# Patient Record
Sex: MALE | Race: WHITE | NOT HISPANIC OR LATINO | Employment: FULL TIME | URBAN - METROPOLITAN AREA
[De-identification: names, ages, dates, MRNs, and addresses within clinical notes are randomized per-mention and may not be internally consistent; named-entity substitution may affect disease eponyms.]

---
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
## 2020-06-01 ENCOUNTER — HOSPITAL ENCOUNTER (OUTPATIENT)
Facility: MEDICAL CENTER | Age: 20
End: 2020-06-01
Attending: PHYSICIAN ASSISTANT
Payer: COMMERCIAL

## 2020-06-01 ENCOUNTER — OFFICE VISIT (OUTPATIENT)
Dept: URGENT CARE | Facility: PHYSICIAN GROUP | Age: 20
End: 2020-06-01
Payer: COMMERCIAL

## 2020-06-01 VITALS
HEART RATE: 64 BPM | TEMPERATURE: 98 F | BODY MASS INDEX: 20.72 KG/M2 | RESPIRATION RATE: 12 BRPM | DIASTOLIC BLOOD PRESSURE: 62 MMHG | SYSTOLIC BLOOD PRESSURE: 112 MMHG | OXYGEN SATURATION: 100 % | WEIGHT: 132 LBS | HEIGHT: 67 IN

## 2020-06-01 DIAGNOSIS — K21.9 GASTROESOPHAGEAL REFLUX DISEASE WITHOUT ESOPHAGITIS: Primary | ICD-10-CM

## 2020-06-01 DIAGNOSIS — R42 DIZZINESS: ICD-10-CM

## 2020-06-01 DIAGNOSIS — R11.0 NAUSEA: ICD-10-CM

## 2020-06-01 DIAGNOSIS — F41.9 ANXIETY: ICD-10-CM

## 2020-06-01 LAB
25(OH)D3 SERPL-MCNC: 35 NG/ML (ref 30–100)
ALBUMIN SERPL BCP-MCNC: 5.2 G/DL (ref 3.2–4.9)
ALBUMIN/GLOB SERPL: 1.9 G/DL
ALP SERPL-CCNC: 74 U/L (ref 30–99)
ALT SERPL-CCNC: 15 U/L (ref 2–50)
ANION GAP SERPL CALC-SCNC: 13 MMOL/L (ref 7–16)
AST SERPL-CCNC: 20 U/L (ref 12–45)
BASOPHILS # BLD AUTO: 0.3 % (ref 0–1.8)
BASOPHILS # BLD: 0.02 K/UL (ref 0–0.12)
BILIRUB SERPL-MCNC: 1.7 MG/DL (ref 0.1–1.5)
BUN SERPL-MCNC: 14 MG/DL (ref 8–22)
CALCIUM SERPL-MCNC: 9.7 MG/DL (ref 8.5–10.5)
CHLORIDE SERPL-SCNC: 100 MMOL/L (ref 96–112)
CO2 SERPL-SCNC: 25 MMOL/L (ref 20–33)
CREAT SERPL-MCNC: 0.83 MG/DL (ref 0.5–1.4)
EKG 4674: NORMAL
EOSINOPHIL # BLD AUTO: 0.03 K/UL (ref 0–0.51)
EOSINOPHIL NFR BLD: 0.4 % (ref 0–6.9)
ERYTHROCYTE [DISTWIDTH] IN BLOOD BY AUTOMATED COUNT: 35.6 FL (ref 35.9–50)
GLOBULIN SER CALC-MCNC: 2.8 G/DL (ref 1.9–3.5)
GLUCOSE SERPL-MCNC: 86 MG/DL (ref 65–99)
HCT VFR BLD AUTO: 48.3 % (ref 42–52)
HGB BLD-MCNC: 17.3 G/DL (ref 14–18)
IMM GRANULOCYTES # BLD AUTO: 0.02 K/UL (ref 0–0.11)
IMM GRANULOCYTES NFR BLD AUTO: 0.3 % (ref 0–0.9)
LYMPHOCYTES # BLD AUTO: 1.34 K/UL (ref 1–4.8)
LYMPHOCYTES NFR BLD: 17.4 % (ref 22–41)
MCH RBC QN AUTO: 30.4 PG (ref 27–33)
MCHC RBC AUTO-ENTMCNC: 35.8 G/DL (ref 33.7–35.3)
MCV RBC AUTO: 84.7 FL (ref 81.4–97.8)
MONOCYTES # BLD AUTO: 0.57 K/UL (ref 0–0.85)
MONOCYTES NFR BLD AUTO: 7.4 % (ref 0–13.4)
NEUTROPHILS # BLD AUTO: 5.71 K/UL (ref 1.82–7.42)
NEUTROPHILS NFR BLD: 74.2 % (ref 44–72)
NRBC # BLD AUTO: 0 K/UL
NRBC BLD-RTO: 0 /100 WBC
PLATELET # BLD AUTO: 287 K/UL (ref 164–446)
PMV BLD AUTO: 9 FL (ref 9–12.9)
POTASSIUM SERPL-SCNC: 4.4 MMOL/L (ref 3.6–5.5)
PROT SERPL-MCNC: 8 G/DL (ref 6–8.2)
RBC # BLD AUTO: 5.7 M/UL (ref 4.7–6.1)
SODIUM SERPL-SCNC: 138 MMOL/L (ref 135–145)
TSH SERPL DL<=0.005 MIU/L-ACNC: 0.69 UIU/ML (ref 0.38–5.33)
WBC # BLD AUTO: 7.7 K/UL (ref 4.8–10.8)

## 2020-06-01 PROCEDURE — 85025 COMPLETE CBC W/AUTO DIFF WBC: CPT

## 2020-06-01 PROCEDURE — 84443 ASSAY THYROID STIM HORMONE: CPT

## 2020-06-01 PROCEDURE — 99204 OFFICE O/P NEW MOD 45 MIN: CPT | Performed by: PHYSICIAN ASSISTANT

## 2020-06-01 PROCEDURE — 82306 VITAMIN D 25 HYDROXY: CPT

## 2020-06-01 PROCEDURE — 80053 COMPREHEN METABOLIC PANEL: CPT

## 2020-06-01 PROCEDURE — 93000 ELECTROCARDIOGRAM COMPLETE: CPT | Performed by: PHYSICIAN ASSISTANT

## 2020-06-01 RX ORDER — PROCHLORPERAZINE MALEATE 5 MG/1
5 TABLET ORAL EVERY 6 HOURS PRN
Qty: 30 TAB | Refills: 3 | Status: SHIPPED | OUTPATIENT
Start: 2020-06-01 | End: 2020-06-11

## 2020-06-01 RX ORDER — OMEPRAZOLE 20 MG/1
20 CAPSULE, DELAYED RELEASE ORAL DAILY
Qty: 30 CAP | Refills: 3 | Status: SHIPPED | OUTPATIENT
Start: 2020-06-01 | End: 2020-06-25 | Stop reason: SDUPTHER

## 2020-06-01 RX ORDER — DIAZEPAM 2 MG/1
2 TABLET ORAL EVERY 6 HOURS PRN
Qty: 30 TAB | Refills: 0 | Status: SHIPPED | OUTPATIENT
Start: 2020-06-01 | End: 2020-06-08

## 2020-06-01 NOTE — PATIENT INSTRUCTIONS
Panic Attacks  Panic attacks are sudden, short feelings of great fear or discomfort. You may have them for no reason when you are relaxed, when you are uneasy (anxious), or when you are sleeping.  Follow these instructions at home:  · Take all your medicines as told.  · Check with your doctor before starting new medicines.  · Keep all doctor visits.  Contact a doctor if:  · You are not able to take your medicines as told.  · Your symptoms do not get better.  · Your symptoms get worse.  Get help right away if:  · Your attacks seem different than your normal attacks.  · You have thoughts about hurting yourself or others.  · You take panic attack medicine and you have a side effect.  This information is not intended to replace advice given to you by your health care provider. Make sure you discuss any questions you have with your health care provider.  Document Released: 01/20/2012 Document Revised: 05/25/2017 Document Reviewed: 08/01/2014  [employer] Interactive Patient Education © 2017 [employer]

## 2020-06-01 NOTE — PROGRESS NOTES
Subjective:      Pt is a 19 y.o. male who presents with Dizziness (Dizziness, heartburn, bloating, nausea, anxiety/panicing  x2days )            HPI  This is a new problem. Pt notes anxiety and panic which has never been diagnosed which he deals with from time to time but had a nosebleed last night which happens rarely and he also noted lightheadedness, dizziness, nausea, heartburn and panic attack with heart racing x 2 days. Pt has not taken any Rx medications for this condition. Pt states the pain is a 7/10, aching in nature and worse at night. Pt denies  SOB,  paresthesias, headaches, hives, or other joint pain. The pt's medication list, problem list, and allergies have been evaluated and reviewed during today's visit.    PMH:  Negative per pt.      PSH:  Negative per pt.      Fam Hx:    family history includes Cancer in his mother; Hyperlipidemia in his father.  Family Status   Relation Name Status   • Mo     • Fa  Alive       Soc HX:  Social History     Socioeconomic History   • Marital status: Single     Spouse name: Not on file   • Number of children: Not on file   • Years of education: Not on file   • Highest education level: Not on file   Occupational History   • Not on file   Social Needs   • Financial resource strain: Not on file   • Food insecurity     Worry: Not on file     Inability: Not on file   • Transportation needs     Medical: Not on file     Non-medical: Not on file   Tobacco Use   • Smoking status: Never Smoker   • Smokeless tobacco: Current User     Types: Chew   Substance and Sexual Activity   • Alcohol use: No   • Drug use: No   • Sexual activity: Never   Lifestyle   • Physical activity     Days per week: Not on file     Minutes per session: Not on file   • Stress: Not on file   Relationships   • Social connections     Talks on phone: Not on file     Gets together: Not on file     Attends Episcopal service: Not on file     Active member of club or organization: Not on file      Attends meetings of clubs or organizations: Not on file     Relationship status: Not on file   • Intimate partner violence     Fear of current or ex partner: Not on file     Emotionally abused: Not on file     Physically abused: Not on file     Forced sexual activity: Not on file   Other Topics Concern   • Behavioral problems Not Asked   • Interpersonal relationships Not Asked   • Sad or not enjoying activities Not Asked   • Suicidal thoughts Not Asked   • Poor school performance Not Asked   • Reading difficulties Not Asked   • Speech difficulties Not Asked   • Writing difficulties Not Asked   • Inadequate sleep Not Asked   • Excessive TV viewing Not Asked   • Excessive video game use Not Asked   • Inadequate exercise Not Asked   • Sports related Not Asked   • Poor diet Not Asked   • Family concerns for drug/alcohol abuse Not Asked   • Poor oral hygiene Not Asked   • Bike safety Not Asked   • Family concerns vehicle safety Not Asked   Social History Narrative   • Not on file         Medications:    Current Outpatient Medications:   •  prochlorperazine (COMPAZINE) 5 MG Tab, Take 1 Tab by mouth every 6 hours as needed for Nausea/Vomiting., Disp: 30 Tab, Rfl: 3  •  omeprazole (PRILOSEC) 20 MG delayed-release capsule, Take 1 Cap by mouth every day for 30 days., Disp: 30 Cap, Rfl: 3  •  diazePAM (VALIUM) 2 MG Tab, Take 1 Tab by mouth every 6 hours as needed for Anxiety for up to 7 days., Disp: 30 Tab, Rfl: 0      Allergies:  Patient has no known allergies.    ROS   Constitutional: Negative for fever, chills and malaise/fatigue.   HENT: Negative for congestion and sore throat.    Eyes: Negative for blurred vision, double vision and photophobia.   Respiratory: Negative for cough and shortness of breath.  Cardiovascular: POS for chest pain and palpitations.   Gastrointestinal: POS for heartburn, nausea, NEG vomiting, abdominal pain, diarrhea and constipation.   Genitourinary: Negative for dysuria and flank pain.  "  Musculoskeletal: Negative for joint pain and myalgias.   Skin: Negative for itching and rash.   Neurological: POS dizziness  Endo/Heme/Allergies: Does not bruise/bleed easily.   Psychiatric/Behavioral: POS anxiety and panic attacks.             Objective:     /62   Pulse 64   Temp 36.7 °C (98 °F) (Temporal)   Resp 12   Ht 1.71 m (5' 7.32\")   Wt 59.9 kg (132 lb)   SpO2 100%   BMI 20.48 kg/m²      Physical Exam       Constitutional: PT is oriented to person, place, and time. PT appears well-developed and well-nourished. No distress.   HENT:   Head: Normocephalic and atraumatic.   Mouth/Throat: Oropharynx is clear and moist. No oropharyngeal exudate.   Eyes: Conjunctivae normal and EOM are normal. Pupils are equal, round, and reactive to light.   Neck: Normal range of motion. Neck supple. No thyromegaly present.   Cardiovascular: Normal rate, regular rhythm, normal heart sounds and intact distal pulses.  Exam reveals no gallop and no friction rub.    No murmur heard.  Pulmonary/Chest: Effort normal and breath sounds normal. No respiratory distress. PT has no wheezes. PT has no rales. Pt exhibits no tenderness.   Abdominal: Soft. Bowel sounds are normal. PT exhibits no distension and no mass. There is no tenderness. There is no rebound and no guarding.   Musculoskeletal: Normal range of motion. PT exhibits no edema and no tenderness.   Neurological: PT is alert and oriented to person, place, and time. PT has normal reflexes. No cranial nerve deficit.   Skin: Skin is warm and dry. No rash noted. PT is not diaphoretic. No erythema.       Psychiatric: PT has a normal mood and affect. PT behavior is normal. Judgment and thought content normal.        Assessment/Plan:       1. Gastroesophageal reflux disease without esophagitis    - omeprazole (PRILOSEC) 20 MG delayed-release capsule; Take 1 Cap by mouth every day for 30 days.  Dispense: 30 Cap; Refill: 3    2. Dizziness    - EKG  - prochlorperazine " (COMPAZINE) 5 MG Tab; Take 1 Tab by mouth every 6 hours as needed for Nausea/Vomiting.  Dispense: 30 Tab; Refill: 3  - CBC WITH DIFFERENTIAL; Future  - Comp Metabolic Panel; Future  - TSH WITH REFLEX TO FT4; Future  - VITAMIN D,25 HYDROXY; Future    3. Anxiety    - REFERRAL TO PSYCHIATRY  - diazePAM (VALIUM) 2 MG Tab; Take 1 Tab by mouth every 6 hours as needed for Anxiety for up to 7 days.  Dispense: 30 Tab; Refill: 0    4. Nausea    - prochlorperazine (COMPAZINE) 5 MG Tab; Take 1 Tab by mouth every 6 hours as needed for Nausea/Vomiting.  Dispense: 30 Tab; Refill: 3  - CBC WITH DIFFERENTIAL; Future  - Comp Metabolic Panel; Future  - TSH WITH REFLEX TO FT4; Future  - VITAMIN D,25 HYDROXY; Future      EKG-->NSR  Rest, fluids encouraged.  Concern for undiagnosed panic and anxiety syndrome: STAT referral to Psych for appropriate evaluation and treatment  Note given for work.  AVS with medical info given.  Pt was in full understanding and agreement with the plan.  Differential diagnosis, natural history, supportive care, and indications for immediate follow-up discussed. All questions answered. Patient agrees with the plan of care.  Follow-up as needed if symptoms worsen or fail to improve to PCP, Urgent care or Emergency Room.

## 2020-06-01 NOTE — LETTER
June 1, 2020       Patient: Jhon Hoff   YOB: 2000   Date of Visit: 6/1/2020         To Whom It May Concern:    In my medical opinion, I recommend that Jhon Hoff may be excused from work for the dates of 6/1/20-6/5/20.      If you have any questions or concerns, please don't hesitate to call [phone]          Sincerely,          Marco Liu P.A.-C.  Electronically Signed

## 2020-06-02 ENCOUNTER — TELEPHONE (OUTPATIENT)
Dept: URGENT CARE | Facility: PHYSICIAN GROUP | Age: 20
End: 2020-06-02

## 2020-06-03 ENCOUNTER — HOSPITAL ENCOUNTER (EMERGENCY)
Facility: MEDICAL CENTER | Age: 20
End: 2020-06-03
Attending: EMERGENCY MEDICINE
Payer: COMMERCIAL

## 2020-06-03 ENCOUNTER — APPOINTMENT (OUTPATIENT)
Dept: RADIOLOGY | Facility: MEDICAL CENTER | Age: 20
End: 2020-06-03
Attending: EMERGENCY MEDICINE
Payer: COMMERCIAL

## 2020-06-03 VITALS
BODY MASS INDEX: 21.07 KG/M2 | SYSTOLIC BLOOD PRESSURE: 109 MMHG | OXYGEN SATURATION: 94 % | DIASTOLIC BLOOD PRESSURE: 57 MMHG | HEART RATE: 63 BPM | WEIGHT: 134.26 LBS | HEIGHT: 67 IN | RESPIRATION RATE: 19 BRPM | TEMPERATURE: 98.3 F

## 2020-06-03 DIAGNOSIS — R42 VERTIGO: ICD-10-CM

## 2020-06-03 LAB
ALBUMIN SERPL BCP-MCNC: 4.9 G/DL (ref 3.2–4.9)
ALBUMIN/GLOB SERPL: 2 G/DL
ALP SERPL-CCNC: 66 U/L (ref 30–99)
ALT SERPL-CCNC: 9 U/L (ref 2–50)
AMPHET UR QL SCN: NEGATIVE
ANION GAP SERPL CALC-SCNC: 13 MMOL/L (ref 7–16)
APPEARANCE UR: CLEAR
AST SERPL-CCNC: 16 U/L (ref 12–45)
BARBITURATES UR QL SCN: NEGATIVE
BASOPHILS # BLD AUTO: 0.5 % (ref 0–1.8)
BASOPHILS # BLD: 0.03 K/UL (ref 0–0.12)
BENZODIAZ UR QL SCN: NEGATIVE
BILIRUB SERPL-MCNC: 0.6 MG/DL (ref 0.1–1.5)
BILIRUB UR QL STRIP.AUTO: NEGATIVE
BUN SERPL-MCNC: 16 MG/DL (ref 8–22)
BZE UR QL SCN: NEGATIVE
CALCIUM SERPL-MCNC: 9.1 MG/DL (ref 8.4–10.2)
CANNABINOIDS UR QL SCN: POSITIVE
CHLORIDE SERPL-SCNC: 103 MMOL/L (ref 96–112)
CO2 SERPL-SCNC: 25 MMOL/L (ref 20–33)
COLOR UR: YELLOW
CREAT SERPL-MCNC: 0.99 MG/DL (ref 0.5–1.4)
EKG IMPRESSION: NORMAL
EOSINOPHIL # BLD AUTO: 0.08 K/UL (ref 0–0.51)
EOSINOPHIL NFR BLD: 1.3 % (ref 0–6.9)
ERYTHROCYTE [DISTWIDTH] IN BLOOD BY AUTOMATED COUNT: 35.4 FL (ref 35.9–50)
GLOBULIN SER CALC-MCNC: 2.5 G/DL (ref 1.9–3.5)
GLUCOSE SERPL-MCNC: 106 MG/DL (ref 65–99)
GLUCOSE UR STRIP.AUTO-MCNC: NEGATIVE MG/DL
HCT VFR BLD AUTO: 45.5 % (ref 42–52)
HGB BLD-MCNC: 16 G/DL (ref 14–18)
IMM GRANULOCYTES # BLD AUTO: 0.02 K/UL (ref 0–0.11)
IMM GRANULOCYTES NFR BLD AUTO: 0.3 % (ref 0–0.9)
KETONES UR STRIP.AUTO-MCNC: NEGATIVE MG/DL
LEUKOCYTE ESTERASE UR QL STRIP.AUTO: NEGATIVE
LIPASE SERPL-CCNC: 23 U/L (ref 7–58)
LYMPHOCYTES # BLD AUTO: 1.78 K/UL (ref 1–4.8)
LYMPHOCYTES NFR BLD: 29.7 % (ref 22–41)
MCH RBC QN AUTO: 29.7 PG (ref 27–33)
MCHC RBC AUTO-ENTMCNC: 35.2 G/DL (ref 33.7–35.3)
MCV RBC AUTO: 84.6 FL (ref 81.4–97.8)
METHADONE UR QL SCN: NEGATIVE
MICRO URNS: NORMAL
MONOCYTES # BLD AUTO: 0.52 K/UL (ref 0–0.85)
MONOCYTES NFR BLD AUTO: 8.7 % (ref 0–13.4)
NEUTROPHILS # BLD AUTO: 3.57 K/UL (ref 1.82–7.42)
NEUTROPHILS NFR BLD: 59.5 % (ref 44–72)
NITRITE UR QL STRIP.AUTO: NEGATIVE
NRBC # BLD AUTO: 0 K/UL
NRBC BLD-RTO: 0 /100 WBC
OPIATES UR QL SCN: NEGATIVE
OXYCODONE UR QL SCN: NEGATIVE
PCP UR QL SCN: NEGATIVE
PH UR STRIP.AUTO: 7 [PH] (ref 5–8)
PLATELET # BLD AUTO: 239 K/UL (ref 164–446)
PMV BLD AUTO: 8.8 FL (ref 9–12.9)
POTASSIUM SERPL-SCNC: 4.7 MMOL/L (ref 3.6–5.5)
PROPOXYPH UR QL SCN: NEGATIVE
PROT SERPL-MCNC: 7.4 G/DL (ref 6–8.2)
PROT UR QL STRIP: NEGATIVE MG/DL
RBC # BLD AUTO: 5.38 M/UL (ref 4.7–6.1)
RBC UR QL AUTO: NEGATIVE
SODIUM SERPL-SCNC: 141 MMOL/L (ref 135–145)
SP GR UR STRIP.AUTO: <=1.005
WBC # BLD AUTO: 6 K/UL (ref 4.8–10.8)

## 2020-06-03 PROCEDURE — 83690 ASSAY OF LIPASE: CPT

## 2020-06-03 PROCEDURE — 700111 HCHG RX REV CODE 636 W/ 250 OVERRIDE (IP): Performed by: EMERGENCY MEDICINE

## 2020-06-03 PROCEDURE — 80307 DRUG TEST PRSMV CHEM ANLYZR: CPT

## 2020-06-03 PROCEDURE — 96374 THER/PROPH/DIAG INJ IV PUSH: CPT

## 2020-06-03 PROCEDURE — 93005 ELECTROCARDIOGRAM TRACING: CPT

## 2020-06-03 PROCEDURE — 70450 CT HEAD/BRAIN W/O DYE: CPT

## 2020-06-03 PROCEDURE — 85025 COMPLETE CBC W/AUTO DIFF WBC: CPT

## 2020-06-03 PROCEDURE — 81003 URINALYSIS AUTO W/O SCOPE: CPT

## 2020-06-03 PROCEDURE — 99284 EMERGENCY DEPT VISIT MOD MDM: CPT

## 2020-06-03 PROCEDURE — 80053 COMPREHEN METABOLIC PANEL: CPT

## 2020-06-03 RX ORDER — ONDANSETRON 2 MG/ML
4 INJECTION INTRAMUSCULAR; INTRAVENOUS ONCE
Status: COMPLETED | OUTPATIENT
Start: 2020-06-03 | End: 2020-06-03

## 2020-06-03 RX ORDER — MECLIZINE HCL 12.5 MG/1
12.5 TABLET ORAL 3 TIMES DAILY PRN
Qty: 30 TAB | Refills: 0 | Status: SHIPPED | OUTPATIENT
Start: 2020-06-03 | End: 2020-06-11 | Stop reason: SDUPTHER

## 2020-06-03 RX ADMIN — ONDANSETRON 4 MG: 2 INJECTION INTRAMUSCULAR; INTRAVENOUS at 09:52

## 2020-06-03 ASSESSMENT — FIBROSIS 4 INDEX: FIB4 SCORE: 0.36

## 2020-06-03 NOTE — TELEPHONE ENCOUNTER
"PT notes feeling worse today and like \"passing out\". Encouraged to go to ER for full work up and he was in agreement.  Marco Liu Pa-C  "

## 2020-06-03 NOTE — ED PROVIDER NOTES
ED Provider Note    CHIEF COMPLAINT  Chief Complaint   Patient presents with   • Dizziness   • Epigastric Pain       HPI  Jhon Hoff is a 19 y.o. male who presents with a chief complaint of dizziness.  Symptoms started about 4 days ago.  He states that he thought he was just overheated from excessive work.  When he lays down he feels motion sickness.  He sits up and get slightly better, but still feeling dizzy.  This makes him nervous and anxious.  Nauseous without vomiting.  He still was having epigastric abdominal discomfort.  He was seen by primary provider 2 days ago.  Started on a medication that has improved this but he still feels mildly nauseous.  Has had normal bowel movements.  He has never vomited.  Denies chest pain.  Denies shortness of breath, weakness, numbness, neurologic symptoms, vision change.  He occasionally will get headaches, but no terrible headache currently.  No ear pain.  No recent illness.  No loss of taste or smell, but has had the loss of appetite.    He was prescribed Compazine and Valium at his previous visit on review of the chart.  He was referred to psychiatry for anxiety.  TSH was normal among other laboratory data    Does not drink alcohol.  Daily marijuana.  No other illicit drugs.    REVIEW OF SYSTEMS  As per HPI, otherwise a 10 point review of systems is negative    PAST MEDICAL HISTORY  History reviewed. No pertinent past medical history.    SOCIAL HISTORY  Social History     Tobacco Use   • Smoking status: Never Smoker   • Smokeless tobacco: Current User     Types: Chew   Substance Use Topics   • Alcohol use: No   • Drug use: Yes     Comment: marijuana daily       SURGICAL HISTORY  History reviewed. No pertinent surgical history.    CURRENT MEDICATIONS  Home Medications     Reviewed by Faith Coffman R.N. (Registered Nurse) on 06/03/20 at 0922  Med List Status: Partial   Medication Last Dose Status   diazePAM (VALIUM) 2 MG Tab  Active   omeprazole (PRILOSEC) 20 MG  "delayed-release capsule  Active   prochlorperazine (COMPAZINE) 5 MG Tab  Active                ALLERGIES  No Known Allergies    PHYSICAL EXAM  VITAL SIGNS: /68   Pulse (!) 57   Temp 36.8 °C (98.3 °F) (Temporal)   Resp 15   Ht 1.702 m (5' 7\")   Wt 60.9 kg (134 lb 4.2 oz)   SpO2 98%   BMI 21.03 kg/m²    Constitutional: Awake and alert  HENT:  Atraumatic, Normocephalic.Oropharynx dry mucus membranes, Nose normal inspection.   Eyes: Normal inspection  Neck: Supple  Cardiovascular: Normal heart rate, Normal rhythm.  Symmetric peripheral pulses.   Thorax & Lungs: No respiratory distress, No wheezing, No rales, No rhonchi, No chest tenderness.   Abdomen: Bowel sounds normal, soft, non-distended, nontender, no mass  Skin: Warm, Dry, No rash.   Back: No tenderness, No CVA tenderness.   Extremities: No clubbing, cyanosis, edema, no Homans or cords   Neurologic: Awake alert oriented.  Cranial nerves intact.  No nystagmus.  No visual field cut.  Symmetric motor and sensory with 5/5 strength.  Steady gait.  Normal finger-nose  Psychiatric: Anxious appearing    RADIOLOGY/PROCEDURES  CT-HEAD W/O   Final Result         1. No acute intracranial abnormality. No evidence of acute intracranial hemorrhage or mass lesion.                    Imaging is interpreted by radiologist    Labs:  Results for orders placed or performed during the hospital encounter of 06/03/20   CBC WITH DIFFERENTIAL   Result Value Ref Range    WBC 6.0 4.8 - 10.8 K/uL    RBC 5.38 4.70 - 6.10 M/uL    Hemoglobin 16.0 14.0 - 18.0 g/dL    Hematocrit 45.5 42.0 - 52.0 %    MCV 84.6 81.4 - 97.8 fL    MCH 29.7 27.0 - 33.0 pg    MCHC 35.2 33.7 - 35.3 g/dL    RDW 35.4 (L) 35.9 - 50.0 fL    Platelet Count 239 164 - 446 K/uL    MPV 8.8 (L) 9.0 - 12.9 fL    Neutrophils-Polys 59.50 44.00 - 72.00 %    Lymphocytes 29.70 22.00 - 41.00 %    Monocytes 8.70 0.00 - 13.40 %    Eosinophils 1.30 0.00 - 6.90 %    Basophils 0.50 0.00 - 1.80 %    Immature Granulocytes 0.30 0.00 " - 0.90 %    Nucleated RBC 0.00 /100 WBC    Neutrophils (Absolute) 3.57 1.82 - 7.42 K/uL    Lymphs (Absolute) 1.78 1.00 - 4.80 K/uL    Monos (Absolute) 0.52 0.00 - 0.85 K/uL    Eos (Absolute) 0.08 0.00 - 0.51 K/uL    Baso (Absolute) 0.03 0.00 - 0.12 K/uL    Immature Granulocytes (abs) 0.02 0.00 - 0.11 K/uL    NRBC (Absolute) 0.00 K/uL   COMP METABOLIC PANEL   Result Value Ref Range    Sodium 141 135 - 145 mmol/L    Potassium 4.7 3.6 - 5.5 mmol/L    Chloride 103 96 - 112 mmol/L    Co2 25 20 - 33 mmol/L    Anion Gap 13.0 7.0 - 16.0    Glucose 106 (H) 65 - 99 mg/dL    Bun 16 8 - 22 mg/dL    Creatinine 0.99 0.50 - 1.40 mg/dL    Calcium 9.1 8.4 - 10.2 mg/dL    AST(SGOT) 16 12 - 45 U/L    ALT(SGPT) 9 2 - 50 U/L    Alkaline Phosphatase 66 30 - 99 U/L    Total Bilirubin 0.6 0.1 - 1.5 mg/dL    Albumin 4.9 3.2 - 4.9 g/dL    Total Protein 7.4 6.0 - 8.2 g/dL    Globulin 2.5 1.9 - 3.5 g/dL    A-G Ratio 2.0 g/dL   LIPASE   Result Value Ref Range    Lipase 23 7 - 58 U/L   URINALYSIS CULTURE, IF INDICATED    Specimen: Urine   Result Value Ref Range    Color Yellow     Character Clear     Specific Gravity <=1.005 <1.035    Ph 7.0 5.0 - 8.0    Glucose Negative Negative mg/dL    Ketones Negative Negative mg/dL    Protein Negative Negative mg/dL    Bilirubin Negative Negative    Nitrite Negative Negative    Leukocyte Esterase Negative Negative    Occult Blood Negative Negative    Micro Urine Req see below    URINE DRUG SCREEN   Result Value Ref Range    Amphetamines Urine Negative Negative    Barbiturates Negative Negative    Benzodiazepines Negative Negative    Cocaine Metabolite Negative Negative    Methadone Negative Negative    Opiates Negative Negative    Oxycodone Negative Negative    Phencyclidine -Pcp Negative Negative    Propoxyphene Negative Negative    Cannabinoid Metab Positive (A) Negative   ESTIMATED GFR   Result Value Ref Range    GFR If African American >60 >60 mL/min/1.73 m 2    GFR If Non  >60 >60  mL/min/1.73 m 2   EKG   Result Value Ref Range    Report       [facility] Emergency Dept.    Test Date:  2020  Pt Name:    VAL JEREZ                  Department[facility]  MRN:        [id number]                      [facility]  Gender:     Male                         Technician: NOEL  :        2000                   Requested By:ER TRIAGE PROTOCOL  Order #:    [id number]                    Reading MD: ANGELICA NGUYEN MD    Measurements  Intervals                                Axis  Rate:       69                           P:          72  MS:         120                          QRS:        37  QRSD:       92                           T:          43  QT:         388  QTc:        416    Interpretive Statements  SINUS RHYTHM  RSR' IN V1 OR V2, PROBABLY NORMAL VARIANT  ST ELEV, PROBABLE NORMAL EARLY REPOL PATTERN  No previous ECG available for comparison  Electronically Signed On 6-3-2020 11:21:37 PDT by ANGELICA NGUYEN MD         Medications   ondansetron (ZOFRAN) syringe/vial injection 4 mg (4 mg Intravenous Given 6/3/20 0952)       COURSE & MEDICAL DECISION MAKING  Presents with motion sickness sensation without any neurologic or other physical findings..  Symptoms are persistent by 2 management.  I repeated laboratory data.  Data was reassuring.  CT imaging of the brain was obtained and was negative.  Was treated with Zofran with improved symptoms.  Suspect most likely viral illness.  Perhaps has labyrinthitis.  He will be treated with meclizine.  He will need further evaluation of persistent symptoms.  Advised continue with plan per previously directed by his primary provider.  He should return to the ER for any headache, focal weakness or numbness, chest pain, shortness of breath or concerning symptoms.    FINAL IMPRESSION  1.  Dizziness-suspected labyrinthitis  2.  Epigastric abdominal pain-suspect gastritis  3.  Anxiety      This dictation was created using voice  recognition software. The accuracy of the dictation is limited to the abilities of the software.  The nursing notes were reviewed and certain aspects of this information were incorporated into this note.      Electronically signed by: Alessandro Lee M.D., 6/3/2020 11:17 AM

## 2020-06-11 ENCOUNTER — OFFICE VISIT (OUTPATIENT)
Dept: MEDICAL GROUP | Facility: MEDICAL CENTER | Age: 20
End: 2020-06-11
Payer: COMMERCIAL

## 2020-06-11 VITALS
WEIGHT: 132.28 LBS | HEIGHT: 67 IN | RESPIRATION RATE: 16 BRPM | TEMPERATURE: 98.1 F | DIASTOLIC BLOOD PRESSURE: 72 MMHG | OXYGEN SATURATION: 98 % | SYSTOLIC BLOOD PRESSURE: 118 MMHG | BODY MASS INDEX: 20.76 KG/M2 | HEART RATE: 94 BPM

## 2020-06-11 DIAGNOSIS — R42 DIZZINESS: ICD-10-CM

## 2020-06-11 PROBLEM — Z00.00 ANNUAL PHYSICAL EXAM: Status: RESOLVED | Noted: 2017-01-24 | Resolved: 2020-06-11

## 2020-06-11 PROCEDURE — 99214 OFFICE O/P EST MOD 30 MIN: CPT | Performed by: PHYSICIAN ASSISTANT

## 2020-06-11 RX ORDER — MECLIZINE HCL 12.5 MG/1
12.5 TABLET ORAL 3 TIMES DAILY PRN
Qty: 30 TAB | Refills: 2 | Status: SHIPPED | OUTPATIENT
Start: 2020-06-11 | End: 2020-07-02

## 2020-06-11 RX ORDER — DIAZEPAM 2 MG/1
TABLET ORAL
COMMUNITY
Start: 2020-06-01 | End: 2020-06-11

## 2020-06-11 RX ORDER — OMEPRAZOLE 20 MG/1
CAPSULE, DELAYED RELEASE ORAL
COMMUNITY
Start: 2020-06-01 | End: 2020-06-11

## 2020-06-11 RX ORDER — PROCHLORPERAZINE MALEATE 5 MG/1
TABLET ORAL
COMMUNITY
Start: 2020-06-01 | End: 2020-06-11

## 2020-06-11 ASSESSMENT — FIBROSIS 4 INDEX: FIB4 SCORE: 0.45

## 2020-06-11 ASSESSMENT — ANXIETY QUESTIONNAIRES
2. NOT BEING ABLE TO STOP OR CONTROL WORRYING: MORE THAN HALF THE DAYS
3. WORRYING TOO MUCH ABOUT DIFFERENT THINGS: MORE THAN HALF THE DAYS
4. TROUBLE RELAXING: NOT AT ALL
1. FEELING NERVOUS, ANXIOUS, OR ON EDGE: NEARLY EVERY DAY
5. BEING SO RESTLESS THAT IT IS HARD TO SIT STILL: NOT AT ALL
6. BECOMING EASILY ANNOYED OR IRRITABLE: MORE THAN HALF THE DAYS
7. FEELING AFRAID AS IF SOMETHING AWFUL MIGHT HAPPEN: MORE THAN HALF THE DAYS
GAD7 TOTAL SCORE: 11

## 2020-06-11 ASSESSMENT — PATIENT HEALTH QUESTIONNAIRE - PHQ9
CLINICAL INTERPRETATION OF PHQ2 SCORE: 2
5. POOR APPETITE OR OVEREATING: 1 - SEVERAL DAYS
SUM OF ALL RESPONSES TO PHQ QUESTIONS 1-9: 11

## 2020-06-11 NOTE — PROGRESS NOTES
"Subjective:   Jhon Hoff is a 19 y.o. male here today for dizziness for approximately 10 days.    Dizziness  This is a 19-year-old male accompanied by his father, Juvenal.  Here today to discuss a history at least since the first of dizziness.  Was initially seen at urgent care and diagnosed with reflux.  Was placed on omeprazole.  Medication has been effective.  Then 2 days later was seen at the ER and diagnosed with possible labyrinth-itis.  Complains of some episodes where the room spins.  Sometimes he feels like he is spinning.  He has some nauseousness.  These symptoms appear to send him into a tailspin where he feels anxious.  He will feel hot with his chest.  He was given meclizine 12.5 mg which he takes 3 times a day.  Dedication has been effective.  On Saturday he stopped the medication and his symptoms return.  He noticed symptoms when he is lying on the left side.  He will set up and they will resolve.  Goes back to the left side and they will return.  CT scan performed was unremarkable.  Denies any change in vision.  No hearing loss.  No tinnitus.  No abdominal pain.       Current medicines (including changes today)  Current Outpatient Medications   Medication Sig Dispense Refill   • meclizine (ANTIVERT) 12.5 MG Tab Take 1 Tab by mouth 3 times a day as needed for Dizziness. 30 Tab 2   • omeprazole (PRILOSEC) 20 MG delayed-release capsule Take 1 Cap by mouth every day for 30 days. 30 Cap 3     No current facility-administered medications for this visit.      He  has no past medical history on file.    Social History and Family History were reviewed and updated.    ROS   No chest pain, no shortness of breath, no abdominal pain and all other systems were reviewed and are negative.       Objective:     /72   Pulse 94   Temp 36.7 °C (98.1 °F) (Temporal)   Resp 16   Ht 1.702 m (5' 7\")   Wt 60 kg (132 lb 4.4 oz)   SpO2 98%  Body mass index is 20.72 kg/m².   Physical Exam:  Constitutional: Alert, no " distress.  Skin: Warm, dry, good turgor, no rashes in visible areas.  Eye: Equal, round and reactive, conjunctiva clear, lids normal.  ENMT: Lips without lesions, good dentition, oropharynx clear.  TMs bilaterally clear.  Neck: Trachea midline, no masses.   Respiratory: Unlabored respiratory effort, lungs clear to auscultation, no wheezes, no ronchi.  Cardiovascular: Normal S1, S2, no murmur, no edema.  Musculoskeletal: CN II through XII intact.  Arlet-Hallpike test negative.  Psych: Alert and oriented x3, normal affect and mood.        Assessment and Plan:   The following treatment plan was discussed    1. Dizziness  Acute, new onset condition.  Symptoms appear to be secondary to vertigo.  Discussed performing modified Epley maneuvers.  Printed out worksheet.  May do 3 times a day.  If effective did refer him to PT for vestibular therapy.  Also provided a renewal of meclizine.  May take it as needed.  Eyes as well to stop medication a couple days to see how symptoms fair.  Expect resolution in a a week or so.  If no improvement he is to contact me in my chart.  Follow-up as well with any worsening symptoms.  - meclizine (ANTIVERT) 12.5 MG Tab; Take 1 Tab by mouth 3 times a day as needed for Dizziness.  Dispense: 30 Tab; Refill: 2  - REFERRAL TO PHYSICAL THERAPY Reason for Therapy: Eval/Treat/Report      Followup: Return if symptoms worsen or fail to improve.    Please note that this dictation was created using voice recognition software. I have made every reasonable attempt to correct obvious errors, but I expect that there are errors of grammar and possibly content that I did not discover before finalizing the note.

## 2020-06-11 NOTE — ASSESSMENT & PLAN NOTE
This is a 19-year-old male accompanied by his father, Juvenal.  Here today to discuss a history at least since the first of dizziness.  Was initially seen at urgent care and diagnosed with reflux.  Was placed on omeprazole.  Medication has been effective.  Then 2 days later was seen at the ER and diagnosed with possible labyrinth-itis.  Complains of some episodes where the room spins.  Sometimes he feels like he is spinning.  He has some nauseousness.  These symptoms appear to send him into a tailspin where he feels anxious.  He will feel hot with his chest.  He was given meclizine 12.5 mg which he takes 3 times a day.  Dedication has been effective.  On Saturday he stopped the medication and his symptoms return.  He noticed symptoms when he is lying on the left side.  He will set up and they will resolve.  Goes back to the left side and they will return.  CT scan performed was unremarkable.  Denies any change in vision.  No hearing loss.  No tinnitus.  No abdominal pain.

## 2020-06-17 ENCOUNTER — HOSPITAL ENCOUNTER (OUTPATIENT)
Dept: RADIOLOGY | Facility: MEDICAL CENTER | Age: 20
End: 2020-06-17
Attending: NURSE PRACTITIONER | Admitting: NURSE PRACTITIONER
Payer: COMMERCIAL

## 2020-06-17 ENCOUNTER — OFFICE VISIT (OUTPATIENT)
Dept: URGENT CARE | Facility: PHYSICIAN GROUP | Age: 20
End: 2020-06-17
Payer: COMMERCIAL

## 2020-06-17 VITALS
TEMPERATURE: 98.8 F | HEART RATE: 76 BPM | RESPIRATION RATE: 14 BRPM | DIASTOLIC BLOOD PRESSURE: 74 MMHG | OXYGEN SATURATION: 98 % | SYSTOLIC BLOOD PRESSURE: 110 MMHG

## 2020-06-17 DIAGNOSIS — R10.11 RUQ PAIN: ICD-10-CM

## 2020-06-17 DIAGNOSIS — I88.0 MESENTERIC ADENITIS: ICD-10-CM

## 2020-06-17 DIAGNOSIS — R11.0 NAUSEA: ICD-10-CM

## 2020-06-17 LAB
APPEARANCE UR: CLEAR
BILIRUB UR STRIP-MCNC: NEGATIVE MG/DL
COLOR UR AUTO: YELLOW
GLUCOSE UR STRIP.AUTO-MCNC: NEGATIVE MG/DL
KETONES UR STRIP.AUTO-MCNC: NEGATIVE MG/DL
LEUKOCYTE ESTERASE UR QL STRIP.AUTO: NEGATIVE
NITRITE UR QL STRIP.AUTO: NEGATIVE
PH UR STRIP.AUTO: 7.5 [PH] (ref 5–8)
PROT UR QL STRIP: NEGATIVE MG/DL
RBC UR QL AUTO: NEGATIVE
SP GR UR STRIP.AUTO: 1.02
UROBILINOGEN UR STRIP-MCNC: NEGATIVE MG/DL

## 2020-06-17 PROCEDURE — 99214 OFFICE O/P EST MOD 30 MIN: CPT | Performed by: NURSE PRACTITIONER

## 2020-06-17 PROCEDURE — 81002 URINALYSIS NONAUTO W/O SCOPE: CPT | Performed by: NURSE PRACTITIONER

## 2020-06-17 PROCEDURE — 700117 HCHG RX CONTRAST REV CODE 255: Performed by: NURSE PRACTITIONER

## 2020-06-17 PROCEDURE — 74177 CT ABD & PELVIS W/CONTRAST: CPT

## 2020-06-17 RX ADMIN — IOHEXOL 25 ML: 240 INJECTION, SOLUTION INTRATHECAL; INTRAVASCULAR; INTRAVENOUS; ORAL at 15:45

## 2020-06-17 RX ADMIN — IOHEXOL 100 ML: 350 INJECTION, SOLUTION INTRAVENOUS at 16:05

## 2020-06-17 ASSESSMENT — ENCOUNTER SYMPTOMS
FLANK PAIN: 0
VOMITING: 0
FEVER: 0
SORE THROAT: 0
BLOOD IN STOOL: 0
DIZZINESS: 0
WHEEZING: 0
ABDOMINAL PAIN: 1
CHILLS: 0
HEADACHES: 0
COUGH: 0
DIARRHEA: 0
EYE DISCHARGE: 0
NAUSEA: 1
CONSTIPATION: 0
STRIDOR: 0
HEARTBURN: 0
EYE REDNESS: 0
SHORTNESS OF BREATH: 0

## 2020-06-17 NOTE — PROGRESS NOTES
"Subjective:   Jhon Hoff is a 19 y.o. male who presents for RUQ Pain (upper abd pain starting yesterday evening. feels like gas)        HPI   Patient with recurrent RUQ and epigastric pain that started a week ago. States he was seen in UC and ER, where he was treated for dizziness and his labs returned normal. States symptoms have worsened. Denies fever, chills or urinary symptoms. Pain is intermittent and worsened after eating. Has been taking Omeprazole without relief. Describes as \"burning sensation.\" States when he lays backwards, it feels like his abdomen is flexing.  Does not drink alcohol. Denies hx of abdominal surgeries    Accompanied by father    Review of Systems   Constitutional: Negative for chills and fever.   HENT: Negative for congestion, ear discharge, ear pain and sore throat.    Eyes: Negative for discharge and redness.   Respiratory: Negative for cough, shortness of breath, wheezing and stridor.    Cardiovascular: Negative for chest pain.   Gastrointestinal: Positive for abdominal pain and nausea. Negative for blood in stool, constipation, diarrhea, heartburn, melena and vomiting.   Genitourinary: Negative for dysuria, flank pain, frequency, hematuria and urgency.   Skin: Negative for itching and rash.   Neurological: Negative for dizziness and headaches.   All other systems reviewed and are negative.    PMH:  has no past medical history on file.  ALLERGIES: No Known Allergies    Patient's PMH, SocHx, SurgHx, FamHx, Drug allergies and medications reviewed.     Objective:   /74 (BP Location: Left arm, Patient Position: Sitting, BP Cuff Size: Small adult)   Pulse 76   Temp 37.1 °C (98.8 °F) (Temporal)   Resp 14   SpO2 98%   Physical Exam  Vitals signs reviewed.   Constitutional:       Appearance: He is well-developed.   HENT:      Right Ear: Hearing normal.      Left Ear: Hearing normal.   Eyes:      Conjunctiva/sclera: Conjunctivae normal.      Pupils: Pupils are equal, round, and " "reactive to light.   Cardiovascular:      Rate and Rhythm: Normal rate and regular rhythm.      Heart sounds: Normal heart sounds. No murmur.   Pulmonary:      Effort: Pulmonary effort is normal. No respiratory distress.      Breath sounds: Normal breath sounds.   Abdominal:      General: Bowel sounds are normal. There is no distension.      Palpations: Abdomen is soft.      Tenderness: There is abdominal tenderness in the right upper quadrant and epigastric area. There is guarding. There is no right CVA tenderness, left CVA tenderness or rebound.   Skin:     General: Skin is warm and dry.      Capillary Refill: Capillary refill takes less than 2 seconds.   Neurological:      Mental Status: He is alert and oriented to person, place, and time.   Psychiatric:         Mood and Affect: Mood normal.         Speech: Speech normal.         Behavior: Behavior normal. Behavior is cooperative.         Thought Content: Thought content normal.         Judgment: Judgment normal.           Assessment/Plan:   Assessment    1. RUQ pain  POCT Urinalysis    CT-ABDOMEN-PELVIS WITH    REFERRAL TO GASTROENTEROLOGY    CANCELED: US-ABDOMEN COMPLETE SURVEY   2. Nausea  REFERRAL TO GASTROENTEROLOGY   3. Mesenteric adenitis  REFERRAL TO GASTROENTEROLOGY     UA negative  CT:\"FINDINGS:  Lung bases: The visualized lung bases are clear.   Abdomen:  No free air is seen in the abdomen or pelvis. The liver, gallbladder and spleen appear unremarkable. No adrenal mass is seen. There is no evidence of hydronephrosis. The mid to distal ureters are not well delineated. There is no biliary ductal   dilatation. Pancreas appears unremarkable. Portal vein is patent. Aorta is not aneurysmal. There are small inguinal and retroperitoneal lymph nodes. There is no evidence of bowel obstruction. There is a moderate amount of colonic stool. Terminal ileum   and visualized appendix appear unremarkable. Mesenteric lymph nodes on the right measure up to 1.4 cm in " "short axis dimension. Bladder is moderately distended. There is trace free fluid in the pelvis.  No acute osseous abnormality is identified.  IMPRESSION:     Enlarged right lower quadrant mesenteric lymph nodes may be reactive. Due to their are prominent size, follow-up may be considered  Trace free fluid in the pelvis.  Moderate amount of colonic stool.\"    Discussed CT results with patient. He denies recent travel or ingesting raw or uncooked foods.  Strict ER precautions discussed  Patient encouraged to increase clear liquid intake  Recommended warm compresses and OTC Stool softener    Differential diagnosis, natural history, supportive care, and indications for immediate follow-up discussed.     **Please note that all invasive procedures during this visit were performed by myself and/or the Medical Assistant under the supervision of the PA or MD in office**      "

## 2020-06-25 ENCOUNTER — OFFICE VISIT (OUTPATIENT)
Dept: MEDICAL GROUP | Facility: MEDICAL CENTER | Age: 20
End: 2020-06-25
Payer: COMMERCIAL

## 2020-06-25 VITALS
RESPIRATION RATE: 16 BRPM | SYSTOLIC BLOOD PRESSURE: 114 MMHG | BODY MASS INDEX: 20.76 KG/M2 | WEIGHT: 132.28 LBS | DIASTOLIC BLOOD PRESSURE: 68 MMHG | HEART RATE: 68 BPM | HEIGHT: 67 IN | TEMPERATURE: 98.4 F | OXYGEN SATURATION: 100 %

## 2020-06-25 DIAGNOSIS — I88.0 MESENTERIC LYMPHADENITIS: ICD-10-CM

## 2020-06-25 DIAGNOSIS — R93.5 ABNORMAL CT OF THE ABDOMEN: ICD-10-CM

## 2020-06-25 DIAGNOSIS — R42 DIZZINESS: ICD-10-CM

## 2020-06-25 DIAGNOSIS — K21.9 GASTROESOPHAGEAL REFLUX DISEASE WITHOUT ESOPHAGITIS: ICD-10-CM

## 2020-06-25 DIAGNOSIS — R10.13 EPIGASTRIC ABDOMINAL PAIN: ICD-10-CM

## 2020-06-25 PROCEDURE — 99214 OFFICE O/P EST MOD 30 MIN: CPT | Performed by: PHYSICIAN ASSISTANT

## 2020-06-25 RX ORDER — OMEPRAZOLE 20 MG/1
20 CAPSULE, DELAYED RELEASE ORAL DAILY
Qty: 90 CAP | Refills: 1 | Status: SHIPPED | OUTPATIENT
Start: 2020-06-25 | End: 2020-07-25

## 2020-06-25 ASSESSMENT — FIBROSIS 4 INDEX: FIB4 SCORE: 0.45

## 2020-06-25 NOTE — PROGRESS NOTES
"Subjective:   Jhon Hoff is a 19 y.o. male here today for epigastric pain and reflux symptoms over the past few weeks and vertigo over the past month.    Epigastric abdominal pain  This is a 19-year-old male accompanied by his father, Juvenal.  Was seen recently urgent care.  Had epigastric pain associated heartburn and reflux.  Was vomiting a acidic bile.  During his ultrasound visit a CT was ordered that showed the following:    IMPRESSION:     Enlarged right lower quadrant mesenteric lymph nodes may be reactive. Due to their are prominent size, follow-up may be considered     Trace free fluid in the pelvis.     Moderate amount of colonic stool.    Because of the findings he was referred to GI.  He slept through his first appointment.  Has an appointment on 13 July.  Complains of epigastric pain still.  Pain though is much less after starting omeprazole.  Takes it at 5 AM.  Does not need until 9.  Last night had Mexican food.  Denies any current nausea vomiting.  No fevers.  No change in bowel habits.    Dizziness  Dizziness has improved.  Vertigo has improved.  Takes meclizine now because if he does not he will have abdominal gas.  He has had a few episodes of vertigo when he has become angry.       Current medicines (including changes today)  Current Outpatient Medications   Medication Sig Dispense Refill   • omeprazole (PRILOSEC) 20 MG delayed-release capsule Take 1 Cap by mouth every day for 30 days. 90 Cap 1   • meclizine (ANTIVERT) 12.5 MG Tab Take 1 Tab by mouth 3 times a day as needed for Dizziness. 30 Tab 2     No current facility-administered medications for this visit.      He  has no past medical history on file.    Social History and Family History were reviewed and updated.    ROS   No chest pain, no shortness of breath, no abdominal pain and all other systems were reviewed and are negative.       Objective:     /68   Pulse 68   Temp 36.9 °C (98.4 °F) (Temporal)   Resp 16   Ht 1.702 m (5' 7\") "   Wt 60 kg (132 lb 4.4 oz)   SpO2 100%  Body mass index is 20.72 kg/m².   Physical Exam:  Constitutional: Alert, no distress.  Skin: Warm, dry, good turgor, no rashes in visible areas.  Eye: Equal, round and reactive, conjunctiva clear, lids normal.  ENMT: Lips without lesions, good dentition, oropharynx clear.  Neck: Trachea midline, no masses.   Lymph: No cervical or supraclavicular lymphadenopathy  Respiratory: Unlabored respiratory effort, lungs clear to auscultation, no wheezes, no ronchi.  Cardiovascular: Normal S1, S2, no murmur, no edema.  Abdomen: Soft, epigastric tenderness.  Psych: Alert and oriented x3, normal affect and mood.        Assessment and Plan:   The following treatment plan was discussed    1. Epigastric abdominal pain  Acute, new onset condition.  Symptoms appear to be secondary to reflux.  Would take omeprazole half hour prior to lunch or dinner.  Watch foods which may trigger symptoms.  Discussed those triggers.  Discussed FODMAP.  Ordered MRI but advised him to have it scheduled after his GI visit.  Hopefully the lymph node involvement will resolve.  - [id number]-L/O; Future  - omeprazole (PRILOSEC) 20 MG delayed-release capsule; Take 1 Cap by mouth every day for 30 days.  Dispense: 90 Cap; Refill: 1    2. Gastroesophageal reflux disease without esophagitis  Acute, new onset condition.  Take omeprazole as directed.  - omeprazole (PRILOSEC) 20 MG delayed-release capsule; Take 1 Cap by mouth every day for 30 days.  Dispense: 90 Cap; Refill: 1    3. Abnormal CT of the abdomen  Ordered MRI.  Advised though to schedule after GI appointment.  - [id number]/O; Future    4. Mesenteric lymphadenitis  Acute, new onset condition.  Unknown cause.  Follow with GI.  Ordered MRI.  - [id number]-M/O; Future    5. Dizziness  Acute, new onset condition.  Stable.  Continues to improve day-to-day.  May continue meclizine as needed.      Followup: Return if symptoms worsen or fail to improve.    Please note  that this dictation was created using voice recognition software. I have made every reasonable attempt to correct obvious errors, but I expect that there are errors of grammar and possibly content that I did not discover before finalizing the note.

## 2020-06-25 NOTE — ASSESSMENT & PLAN NOTE
Dizziness has improved.  Vertigo has improved.  Takes meclizine now because if he does not he will have abdominal gas.  He has had a few episodes of vertigo when he has become angry.

## 2020-06-25 NOTE — ASSESSMENT & PLAN NOTE
This is a 19-year-old male accompanied by his father, Juvenal.  Was seen recently urgent care.  Had epigastric pain associated heartburn and reflux.  Was vomiting a acidic bile.  During his ultrasound visit a CT was ordered that showed the following:    IMPRESSION:     Enlarged right lower quadrant mesenteric lymph nodes may be reactive. Due to their are prominent size, follow-up may be considered     Trace free fluid in the pelvis.     Moderate amount of colonic stool.    Because of the findings he was referred to GI.  He slept through his first appointment.  Has an appointment on 13 July.  Complains of epigastric pain still.  Pain though is much less after starting omeprazole.  Takes it at 5 AM.  Does not need until 9.  Last night had Mexican food.  Denies any current nausea vomiting.  No fevers.  No change in bowel habits.

## 2020-07-02 ENCOUNTER — APPOINTMENT (OUTPATIENT)
Dept: RADIOLOGY | Facility: MEDICAL CENTER | Age: 20
End: 2020-07-02
Attending: EMERGENCY MEDICINE
Payer: COMMERCIAL

## 2020-07-02 ENCOUNTER — HOSPITAL ENCOUNTER (EMERGENCY)
Facility: MEDICAL CENTER | Age: 20
End: 2020-07-02
Attending: EMERGENCY MEDICINE
Payer: COMMERCIAL

## 2020-07-02 VITALS
TEMPERATURE: 98.4 F | OXYGEN SATURATION: 92 % | HEART RATE: 74 BPM | RESPIRATION RATE: 20 BRPM | WEIGHT: 132.72 LBS | HEIGHT: 67 IN | BODY MASS INDEX: 20.83 KG/M2 | SYSTOLIC BLOOD PRESSURE: 125 MMHG | DIASTOLIC BLOOD PRESSURE: 63 MMHG

## 2020-07-02 DIAGNOSIS — K22.719 BARRETT'S ESOPHAGUS WITH DYSPLASIA: ICD-10-CM

## 2020-07-02 DIAGNOSIS — R14.0 BLOATING: ICD-10-CM

## 2020-07-02 DIAGNOSIS — R10.9 ABDOMINAL PAIN, UNSPECIFIED ABDOMINAL LOCATION: ICD-10-CM

## 2020-07-02 DIAGNOSIS — J02.9 SORE THROAT: ICD-10-CM

## 2020-07-02 DIAGNOSIS — R11.0 NAUSEA: ICD-10-CM

## 2020-07-02 LAB
ALBUMIN SERPL BCP-MCNC: 4.7 G/DL (ref 3.2–4.9)
ALBUMIN/GLOB SERPL: 1.6 G/DL
ALP SERPL-CCNC: 71 U/L (ref 30–99)
ALT SERPL-CCNC: 14 U/L (ref 2–50)
ANION GAP SERPL CALC-SCNC: 10 MMOL/L (ref 7–16)
AST SERPL-CCNC: 17 U/L (ref 12–45)
BASOPHILS # BLD AUTO: 0.4 % (ref 0–1.8)
BASOPHILS # BLD: 0.02 K/UL (ref 0–0.12)
BILIRUB SERPL-MCNC: 0.8 MG/DL (ref 0.1–1.5)
BUN SERPL-MCNC: 20 MG/DL (ref 8–22)
CALCIUM SERPL-MCNC: 9.2 MG/DL (ref 8.4–10.2)
CHLORIDE SERPL-SCNC: 99 MMOL/L (ref 96–112)
CO2 SERPL-SCNC: 27 MMOL/L (ref 20–33)
CREAT SERPL-MCNC: 0.96 MG/DL (ref 0.5–1.4)
EOSINOPHIL # BLD AUTO: 0.05 K/UL (ref 0–0.51)
EOSINOPHIL NFR BLD: 0.9 % (ref 0–6.9)
ERYTHROCYTE [DISTWIDTH] IN BLOOD BY AUTOMATED COUNT: 35.6 FL (ref 35.9–50)
GLOBULIN SER CALC-MCNC: 3 G/DL (ref 1.9–3.5)
GLUCOSE SERPL-MCNC: 98 MG/DL (ref 65–99)
HCT VFR BLD AUTO: 47.6 % (ref 42–52)
HGB BLD-MCNC: 16.5 G/DL (ref 14–18)
IMM GRANULOCYTES # BLD AUTO: 0.03 K/UL (ref 0–0.11)
IMM GRANULOCYTES NFR BLD AUTO: 0.5 % (ref 0–0.9)
LIPASE SERPL-CCNC: 17 U/L (ref 7–58)
LYMPHOCYTES # BLD AUTO: 1.39 K/UL (ref 1–4.8)
LYMPHOCYTES NFR BLD: 25 % (ref 22–41)
MCH RBC QN AUTO: 29.4 PG (ref 27–33)
MCHC RBC AUTO-ENTMCNC: 34.7 G/DL (ref 33.7–35.3)
MCV RBC AUTO: 84.8 FL (ref 81.4–97.8)
MONOCYTES # BLD AUTO: 0.41 K/UL (ref 0–0.85)
MONOCYTES NFR BLD AUTO: 7.4 % (ref 0–13.4)
NEUTROPHILS # BLD AUTO: 3.67 K/UL (ref 1.82–7.42)
NEUTROPHILS NFR BLD: 65.8 % (ref 44–72)
NRBC # BLD AUTO: 0 K/UL
NRBC BLD-RTO: 0 /100 WBC
PLATELET # BLD AUTO: 265 K/UL (ref 164–446)
PMV BLD AUTO: 8.9 FL (ref 9–12.9)
POTASSIUM SERPL-SCNC: 4.5 MMOL/L (ref 3.6–5.5)
PROT SERPL-MCNC: 7.7 G/DL (ref 6–8.2)
RBC # BLD AUTO: 5.61 M/UL (ref 4.7–6.1)
S PYO AG THROAT QL: NORMAL
SIGNIFICANT IND 70042: NORMAL
SITE SITE: NORMAL
SODIUM SERPL-SCNC: 136 MMOL/L (ref 135–145)
SOURCE SOURCE: NORMAL
WBC # BLD AUTO: 5.6 K/UL (ref 4.8–10.8)

## 2020-07-02 PROCEDURE — 99284 EMERGENCY DEPT VISIT MOD MDM: CPT

## 2020-07-02 PROCEDURE — 96374 THER/PROPH/DIAG INJ IV PUSH: CPT

## 2020-07-02 PROCEDURE — 700117 HCHG RX CONTRAST REV CODE 255: Performed by: EMERGENCY MEDICINE

## 2020-07-02 PROCEDURE — 74177 CT ABD & PELVIS W/CONTRAST: CPT

## 2020-07-02 PROCEDURE — 85025 COMPLETE CBC W/AUTO DIFF WBC: CPT

## 2020-07-02 PROCEDURE — 83690 ASSAY OF LIPASE: CPT

## 2020-07-02 PROCEDURE — 87880 STREP A ASSAY W/OPTIC: CPT

## 2020-07-02 PROCEDURE — 96375 TX/PRO/DX INJ NEW DRUG ADDON: CPT

## 2020-07-02 PROCEDURE — 700111 HCHG RX REV CODE 636 W/ 250 OVERRIDE (IP): Performed by: EMERGENCY MEDICINE

## 2020-07-02 PROCEDURE — 87081 CULTURE SCREEN ONLY: CPT

## 2020-07-02 PROCEDURE — 80053 COMPREHEN METABOLIC PANEL: CPT

## 2020-07-02 RX ORDER — SIMETHICONE 80 MG
80 TABLET,CHEWABLE ORAL EVERY 6 HOURS PRN
Status: SHIPPED | COMMUNITY
End: 2020-07-24

## 2020-07-02 RX ORDER — ONDANSETRON 4 MG/1
4 TABLET, ORALLY DISINTEGRATING ORAL EVERY 6 HOURS PRN
Status: SHIPPED | COMMUNITY
End: 2022-09-16

## 2020-07-02 RX ORDER — HYDROCODONE BITARTRATE AND ACETAMINOPHEN 5; 325 MG/1; MG/1
1-2 TABLET ORAL EVERY 4 HOURS PRN
Qty: 20 TAB | Refills: 0 | Status: SHIPPED | OUTPATIENT
Start: 2020-07-02 | End: 2020-07-05

## 2020-07-02 RX ORDER — ONDANSETRON 2 MG/ML
4 INJECTION INTRAMUSCULAR; INTRAVENOUS ONCE
Status: COMPLETED | OUTPATIENT
Start: 2020-07-02 | End: 2020-07-02

## 2020-07-02 RX ORDER — MORPHINE SULFATE 4 MG/ML
4 INJECTION, SOLUTION INTRAMUSCULAR; INTRAVENOUS ONCE
Status: COMPLETED | OUTPATIENT
Start: 2020-07-02 | End: 2020-07-02

## 2020-07-02 RX ADMIN — ONDANSETRON 4 MG: 2 INJECTION INTRAMUSCULAR; INTRAVENOUS at 12:53

## 2020-07-02 RX ADMIN — IOHEXOL 100 ML: 350 INJECTION, SOLUTION INTRAVENOUS at 13:36

## 2020-07-02 RX ADMIN — MORPHINE SULFATE 4 MG: 4 INJECTION INTRAVENOUS at 12:54

## 2020-07-02 ASSESSMENT — FIBROSIS 4 INDEX: FIB4 SCORE: 0.45

## 2020-07-02 NOTE — Clinical Note
Jhon Hoff was seen and treated in our emergency department on 7/2/2020.  He may return to work on 07/05/2020.       If you have any questions or concerns, please don't hesitate to call.      Gonzalez Santana M.D.

## 2020-07-02 NOTE — ED PROVIDER NOTES
ED Provider Note    CHIEF COMPLAINT  Chief Complaint   Patient presents with   • Nausea     ysterday   • Bloating     this am   • Chills     this am   • Sore Throat     this am       HPI  Jhon Hoff is a 19 y.o. male who presents with multiple complaints.  He is complaining mostly of a sore throat and bloating.  The patient is been having symptoms for the past 3 weeks.  He had an EGD performed yesterday.  This morning he awoke with a sore throat.  He had chills this morning but does not think he has had any fevers.  Had the sensation of bloating over the past 3 weeks.  He is having nausea.  He states his bowel movements have been normal.  He has had no previous abdominal surgeries.  In looking at the report it appears that he had evidence of Colmenares's esophagus.    REVIEW OF SYSTEMS  See HPI for further details. All other systems negative.    PAST MEDICAL HISTORY  Past Medical History:   Diagnosis Date   • GERD (gastroesophageal reflux disease)        FAMILY HISTORY  Family History   Problem Relation Age of Onset   • Cancer Mother         breast   • Hyperlipidemia Father        SOCIAL HISTORY  Social History     Socioeconomic History   • Marital status: Single     Spouse name: Not on file   • Number of children: Not on file   • Years of education: Not on file   • Highest education level: Not on file   Occupational History   • Not on file   Social Needs   • Financial resource strain: Not on file   • Food insecurity     Worry: Not on file     Inability: Not on file   • Transportation needs     Medical: Not on file     Non-medical: Not on file   Tobacco Use   • Smoking status: Never Smoker   • Smokeless tobacco: Current User     Types: Chew   Substance and Sexual Activity   • Alcohol use: No   • Drug use: Yes     Comment: marijuana daily   • Sexual activity: Never   Lifestyle   • Physical activity     Days per week: Not on file     Minutes per session: Not on file   • Stress: Not on file   Relationships   • Social  "connections     Talks on phone: Not on file     Gets together: Not on file     Attends Sikhism service: Not on file     Active member of club or organization: Not on file     Attends meetings of clubs or organizations: Not on file     Relationship status: Not on file   • Intimate partner violence     Fear of current or ex partner: Not on file     Emotionally abused: Not on file     Physically abused: Not on file     Forced sexual activity: Not on file   Other Topics Concern   • Behavioral problems Not Asked   • Interpersonal relationships Not Asked   • Sad or not enjoying activities Not Asked   • Suicidal thoughts Not Asked   • Poor school performance Not Asked   • Reading difficulties Not Asked   • Speech difficulties Not Asked   • Writing difficulties Not Asked   • Inadequate sleep Not Asked   • Excessive TV viewing Not Asked   • Excessive video game use Not Asked   • Inadequate exercise Not Asked   • Sports related Not Asked   • Poor diet Not Asked   • Family concerns for drug/alcohol abuse Not Asked   • Poor oral hygiene Not Asked   • Bike safety Not Asked   • Family concerns vehicle safety Not Asked   Social History Narrative   • Not on file       SURGICAL HISTORY  History reviewed. No pertinent surgical history.    CURRENT MEDICATIONS  Home Medications    **Home medications have not yet been reviewed for this encounter**         ALLERGIES  No Known Allergies    PHYSICAL EXAM  VITAL SIGNS: /83   Pulse (!) 110   Temp 36.9 °C (98.4 °F) (Temporal)   Resp 20   Ht 1.702 m (5' 7\")   Wt 60.2 kg (132 lb 11.5 oz)   SpO2 94%   BMI 20.79 kg/m²   Constitutional: Well developed, Well nourished,  Non-toxic appearance.   HENT: Normocephalic, Atraumatic, Oropharynx moist, diffuse pharyngeal erythema with 2+ tonsils but no exudates noted.  Eyes:  EOMI, Conjunctiva normal, No discharge.   Cardiovascular: Tachycardic, Normal rhythm, No murmurs, No rubs, No gallops.   Thorax & Lungs: Clear to auscultation without " wheezes, rales, or rhonchi.  Abdomen: Soft, mild diffuse tenderness.  No true distention.  No guarding or rebound.  Skin: Warm, Dry.  Musculoskeletal: Good range of motion in all major joints.   Neurologic: Awake and alert, No focal deficits noted.       RADIOLOGY/PROCEDURES  CT-ABDOMEN-PELVIS WITH   Final Result      1.  Increased bowel gas, residual from recent endoscopy versus ileus.   2.  No evidence for bowel perforation.   3.  Small amount of pelvic fluid present, raising concern for intra-abdominal inflammatory process.   4.  Mildly enlarged RIGHT lower quadrant mesenteric lymph node, decreased in size from prior.   5.  No CT evidence for appendicitis.            COURSE & MEDICAL DECISION MAKING  Pertinent Labs & Imaging studies reviewed. (See chart for details)  Is a 19-year-old here for evaluation of abdominal pain and distention as well as sore throat and nausea.  Patient had an EGD yesterday.  He is afebrile.  He is slightly tachycardic.  On exam he has diffuse tenderness but does not appear to be wildly distended.  He does appear to have a diffuse pharyngeal erythema.  Rapid strep is come back negative.  His laboratories include chemistries which are completely normal to include liver function studies and lipase.  CBC is normal.  I reviewed his paperwork from [facility] from yesterday and it appears that he was diagnosed as having Colmenares's esophagus which certainly may be causing his symptoms.  I think his sore throat is likely secondary to having had his EGD yesterday.  It does not appear to be strep certainly he may have a viral pharyngitis but I think this most likely represents a mechanical issue.  His CT scan shows increased bowel gas which I believe that is due to his EGD yesterday.  I do not believe he has a bowel obstruction at this time.  There is no evidence of perforation.  He does have a small amount of pelvic fluid which is been noted on a previous CT as well.  He has a mildly enlarged  right lower quadrant mesenteric lymph node which is actually decreased in size from previous study.  No evidence of appendicitis.  Patient is treated with morphine and Zofran here.  Upon repeat evaluation he states he is actually feeling much better.  I discussed the results of all the studies with the patient and his father.  I spoken with him at length that I do not believe this is due to hepatitis, cholecystitis, pancreatitis, or appendicitis.  I do not think he has a surgical abdomen.  He is complaining of bloating and in fact on CT scan he does have a large amount of bowel gas which I think is due to his EGD yesterday and he should pass that within the next 24 hours.  At this point I will discharge him home.  I will have him continue with his omeprazole.  He states that he was told by the GI doctor that the office would be in contact him with him within 72 hours of the procedure.  I expect that they will contact him in the next 48 hours at this point.  He should inform them that he was here in the emergency department and they will have access to all of the studies that I have done today.  I will provide a prescription for Norco.  He has Zofran at home.  I reviewed his prescription monitoring report and he will sign a consent for treatment with narcotics.  He will return here for any worsening symptoms.    FINAL IMPRESSION  1.  Abdominal pain  2.  Bloating  3.  Sore throat  4.  Nausea         Electronically signed by: Gonzalez Santana M.D., 7/2/2020 12:46 PM

## 2020-07-02 NOTE — ED TRIAGE NOTES
Pt to er s/p egd yesterday with dr carter with c/o nausea, bloating, chills and sore throat  this am. Afebrile in triage, took zofran at home w/o relief. Denies known covid exposure or recent travel

## 2020-07-04 LAB
S PYO SPEC QL CULT: NORMAL
SIGNIFICANT IND 70042: NORMAL
SITE SITE: NORMAL
SOURCE SOURCE: NORMAL

## 2020-07-24 ENCOUNTER — OFFICE VISIT (OUTPATIENT)
Dept: MEDICAL GROUP | Facility: MEDICAL CENTER | Age: 20
End: 2020-07-24
Payer: COMMERCIAL

## 2020-07-24 VITALS
OXYGEN SATURATION: 96 % | BODY MASS INDEX: 20.42 KG/M2 | TEMPERATURE: 98.4 F | SYSTOLIC BLOOD PRESSURE: 108 MMHG | WEIGHT: 130.07 LBS | HEART RATE: 98 BPM | DIASTOLIC BLOOD PRESSURE: 68 MMHG | HEIGHT: 67 IN | RESPIRATION RATE: 16 BRPM

## 2020-07-24 DIAGNOSIS — R51.9 ACUTE INTRACTABLE HEADACHE, UNSPECIFIED HEADACHE TYPE: ICD-10-CM

## 2020-07-24 DIAGNOSIS — R10.13 EPIGASTRIC ABDOMINAL PAIN: ICD-10-CM

## 2020-07-24 DIAGNOSIS — R19.7 DIARRHEA, UNSPECIFIED TYPE: ICD-10-CM

## 2020-07-24 PROCEDURE — 99214 OFFICE O/P EST MOD 30 MIN: CPT | Performed by: PHYSICIAN ASSISTANT

## 2020-07-24 RX ORDER — HYDROCODONE BITARTRATE AND ACETAMINOPHEN 5; 325 MG/1; MG/1
1 TABLET ORAL EVERY 8 HOURS PRN
Qty: 30 TAB | Refills: 0 | Status: SHIPPED | OUTPATIENT
Start: 2020-07-24 | End: 2020-08-23

## 2020-07-24 ASSESSMENT — FIBROSIS 4 INDEX: FIB4 SCORE: 0.34

## 2020-07-24 NOTE — ASSESSMENT & PLAN NOTE
This is a 19-year-old male who is complaining of a four-day history of diarrhea.  3 loose watery stool a day.  Denies any rectal bleeding.  Has had associated headaches.  No known sick contacts.  Went fishing about a week ago.  Still has some epigastric pain as well as some concerns with his throat.  Earlier this month he was seen at the ER because of a sore throat status post EGD.  On EGD it was noted that he had Colmenares's esophagus.  His GI doctor increased his Prilosec to twice daily use.  He was also discharged by the ER with hydrocodone which is effective when he takes it as needed.  He also will use a GI cocktail when symptoms are worse as well as Pepto-Bismol, Zofran and gel use still.  He states as he has been using gel you still which is very effective.  He has an appointment with GI in September.  Would like a renewal of hydrocodone which he will take when symptoms are worse and it is effective.  Initial prescription was provided on the second with 20 tablets and he still has a few left.

## 2020-08-09 DIAGNOSIS — R10.13 EPIGASTRIC ABDOMINAL PAIN: ICD-10-CM

## 2020-08-11 ENCOUNTER — APPOINTMENT (OUTPATIENT)
Dept: MEDICAL GROUP | Facility: MEDICAL CENTER | Age: 20
End: 2020-08-11
Payer: COMMERCIAL

## 2020-08-24 ENCOUNTER — HOSPITAL ENCOUNTER (OUTPATIENT)
Dept: LAB | Facility: MEDICAL CENTER | Age: 20
End: 2020-08-24
Attending: PHYSICIAN ASSISTANT
Payer: COMMERCIAL

## 2020-08-24 DIAGNOSIS — R19.7 DIARRHEA, UNSPECIFIED TYPE: ICD-10-CM

## 2020-08-24 DIAGNOSIS — R51.9 ACUTE INTRACTABLE HEADACHE, UNSPECIFIED HEADACHE TYPE: ICD-10-CM

## 2020-08-24 DIAGNOSIS — R10.13 EPIGASTRIC ABDOMINAL PAIN: ICD-10-CM

## 2020-08-24 LAB
ALBUMIN SERPL BCP-MCNC: 4.6 G/DL (ref 3.2–4.9)
ALBUMIN/GLOB SERPL: 1.7 G/DL
ALP SERPL-CCNC: 72 U/L (ref 30–99)
ALT SERPL-CCNC: 13 U/L (ref 2–50)
ANION GAP SERPL CALC-SCNC: 11 MMOL/L (ref 7–16)
AST SERPL-CCNC: 17 U/L (ref 12–45)
BASOPHILS # BLD AUTO: 0.4 % (ref 0–1.8)
BASOPHILS # BLD: 0.02 K/UL (ref 0–0.12)
BILIRUB SERPL-MCNC: 0.8 MG/DL (ref 0.1–1.5)
BUN SERPL-MCNC: 18 MG/DL (ref 8–22)
CALCIUM SERPL-MCNC: 9.2 MG/DL (ref 8.4–10.2)
CHLORIDE SERPL-SCNC: 104 MMOL/L (ref 96–112)
CO2 SERPL-SCNC: 26 MMOL/L (ref 20–33)
CREAT SERPL-MCNC: 0.99 MG/DL (ref 0.5–1.4)
EOSINOPHIL # BLD AUTO: 0.09 K/UL (ref 0–0.51)
EOSINOPHIL NFR BLD: 1.9 % (ref 0–6.9)
ERYTHROCYTE [DISTWIDTH] IN BLOOD BY AUTOMATED COUNT: 36.8 FL (ref 35.9–50)
FASTING STATUS PATIENT QL REPORTED: NORMAL
GLOBULIN SER CALC-MCNC: 2.7 G/DL (ref 1.9–3.5)
GLUCOSE SERPL-MCNC: 99 MG/DL (ref 65–99)
HCT VFR BLD AUTO: 44.4 % (ref 42–52)
HGB BLD-MCNC: 15.9 G/DL (ref 14–18)
IMM GRANULOCYTES # BLD AUTO: 0.01 K/UL (ref 0–0.11)
IMM GRANULOCYTES NFR BLD AUTO: 0.2 % (ref 0–0.9)
LYMPHOCYTES # BLD AUTO: 1.66 K/UL (ref 1–4.8)
LYMPHOCYTES NFR BLD: 34.8 % (ref 22–41)
MCH RBC QN AUTO: 29.5 PG (ref 27–33)
MCHC RBC AUTO-ENTMCNC: 35.8 G/DL (ref 33.7–35.3)
MCV RBC AUTO: 82.4 FL (ref 81.4–97.8)
MONOCYTES # BLD AUTO: 0.43 K/UL (ref 0–0.85)
MONOCYTES NFR BLD AUTO: 9 % (ref 0–13.4)
NEUTROPHILS # BLD AUTO: 2.56 K/UL (ref 1.82–7.42)
NEUTROPHILS NFR BLD: 53.7 % (ref 44–72)
NRBC # BLD AUTO: 0 K/UL
NRBC BLD-RTO: 0 /100 WBC
PLATELET # BLD AUTO: 218 K/UL (ref 164–446)
PMV BLD AUTO: 8.6 FL (ref 9–12.9)
POTASSIUM SERPL-SCNC: 4.7 MMOL/L (ref 3.6–5.5)
PROT SERPL-MCNC: 7.3 G/DL (ref 6–8.2)
RBC # BLD AUTO: 5.39 M/UL (ref 4.7–6.1)
SODIUM SERPL-SCNC: 141 MMOL/L (ref 135–145)
WBC # BLD AUTO: 4.8 K/UL (ref 4.8–10.8)

## 2020-08-24 PROCEDURE — 85025 COMPLETE CBC W/AUTO DIFF WBC: CPT

## 2020-08-24 PROCEDURE — 80053 COMPREHEN METABOLIC PANEL: CPT

## 2020-08-24 PROCEDURE — 36415 COLL VENOUS BLD VENIPUNCTURE: CPT

## 2020-08-24 PROCEDURE — 83013 H PYLORI (C-13) BREATH: CPT

## 2020-08-25 LAB — UREA BREATH TEST QL: NEGATIVE

## 2020-09-02 DIAGNOSIS — R19.7 DIARRHEA, UNSPECIFIED TYPE: ICD-10-CM

## 2020-09-02 DIAGNOSIS — R93.5 ABNORMAL CT OF THE ABDOMEN: ICD-10-CM

## 2020-09-02 DIAGNOSIS — I88.0 MESENTERIC LYMPHADENITIS: ICD-10-CM

## 2020-09-02 DIAGNOSIS — R10.13 EPIGASTRIC ABDOMINAL PAIN: ICD-10-CM

## 2021-08-14 NOTE — ED TRIAGE NOTES
Chief Complaint   Patient presents with   • Dizziness   • Epigastric Pain      pt seen for same at , reports that symptoms have not improved, has been taking valium, prilosec and compazine as well as last marijuana last night. Pt placed on monitor. EKG obtained.   Chart up for ERP.    Yes

## 2021-11-22 NOTE — PROGRESS NOTES
Subjective:   Jhon Hoff is a 19 y.o. male here today for diarrhea, headache, abdominal pain and difficulty swallowing.    Diarrhea  This is a 19-year-old male who is complaining of a four-day history of diarrhea.  3 loose watery stool a day.  Denies any rectal bleeding.  Has had associated headaches.  No known sick contacts.  Went fishing about a week ago.  Still has some epigastric pain as well as some concerns with his throat.  Earlier this month he was seen at the ER because of a sore throat status post EGD.  On EGD it was noted that he had Colmenares's esophagus.  His GI doctor increased his Prilosec to twice daily use.  He was also discharged by the ER with hydrocodone which is effective when he takes it as needed.  He also will use a GI cocktail when symptoms are worse as well as Pepto-Bismol, Zofran and gel use still.  He states as he has been using gel you still which is very effective.  He has an appointment with GI in September.  Would like a renewal of hydrocodone which he will take when symptoms are worse and it is effective.  Initial prescription was provided on the second with 20 tablets and he still has a few left.       Current medicines (including changes today)  Current Outpatient Medications   Medication Sig Dispense Refill   • Alum & Mag Hydroxide-Simeth (GELUSIL PO) Take 2 Tabs by mouth 3 times a day.     • HYDROcodone-acetaminophen (NORCO) 5-325 MG Tab per tablet Take 1 Tab by mouth every 8 hours as needed for up to 30 days. 30 Tab 0   • ondansetron (ZOFRAN ODT) 4 MG TABLET DISPERSIBLE Take 4 mg by mouth every 6 hours as needed for Nausea.     • hyoscyamine-maalox plus-lidocaine viscous (GI COCKTAIL) Take 15 mL by mouth every 6 hours as needed.     • bismuth subsalicylate (PEPTO-BISMOL) 262 MG/15ML Suspension Take 30 mL by mouth every 6 hours as needed.     • omeprazole (PRILOSEC) 20 MG delayed-release capsule Take 1 Cap by mouth every day for 30 days. 90 Cap 1     No current  "facility-administered medications for this visit.      He  has a past medical history of GERD (gastroesophageal reflux disease).    Social History and Family History were reviewed and updated.    ROS   No chest pain, no shortness of breath, no abdominal pain and all other systems were reviewed and are negative.       Objective:     /68   Pulse 98   Temp 36.9 °C (98.4 °F) (Temporal)   Resp 16   Ht 1.702 m (5' 7\")   Wt 59 kg (130 lb 1.1 oz)   SpO2 96%  Body mass index is 20.37 kg/m².   Physical Exam:  Constitutional: Alert, no distress.  Skin: Warm, dry, good turgor, no rashes in visible areas.  Eye: Equal, round and reactive, conjunctiva clear, lids normal.  ENMT: Lips without lesions, good dentition, oropharynx clear.  Neck: Trachea midline, no masses.   Lymph: No cervical or supraclavicular lymphadenopathy  Respiratory: Unlabored respiratory effort, lungs appear clear, no wheezes.  Cardiovascular: Regular rate and rhythm.  Psych: Alert and oriented x3, normal affect and mood.        Assessment and Plan:   The following treatment plan was discussed    1. Diarrhea, unspecified type  Acute, new onset condition.  Unknown cause.  Ordered PCR testing for COVID.  Order labs after that to see if he has any infectious process.  He will contact me through my chart if his diarrhea continues.  Cut back on omeprazole to once daily.  - COVID/SARS COV-2 PCR; Future  - CBC WITH DIFFERENTIAL; Future  - Comp Metabolic Panel; Future    2. Epigastric abdominal pain  Chronic condition.  Uncontrolled.  Renew Norco as directed.  Follow-up with GI.  Order labs to be performed after PCR testing for COVID.  - CBC WITH DIFFERENTIAL; Future  - Comp Metabolic Panel; Future  - HYDROcodone-acetaminophen (NORCO) 5-325 MG Tab per tablet; Take 1 Tab by mouth every 8 hours as needed for up to 30 days.  Dispense: 30 Tab; Refill: 0    3. Acute intractable headache, unspecified headache type  Acute, new onset condition.  Unknown cause.  " Follow-up with cover testing.  - CBC WITH DIFFERENTIAL; Future  - Comp Metabolic Panel; Future      Followup: Return in about 3 weeks (around 8/14/2020), or if symptoms worsen or fail to improve.    Please note that this dictation was created using voice recognition software. I have made every reasonable attempt to correct obvious errors, but I expect that there are errors of grammar and possibly content that I did not discover before finalizing the note.              Patient is seen and examined at the bed side, is afebrile now, spiked fever earlier.. The wbc count stay elevated. The LFts are worsening,.       REVIEW OF SYSTEMS: All other review systems are negative      ALLERGIES: No Known Allergies      Vital Signs Last 24 Hrs  T(C): 36.4 (2021 14:54), Max: 38.3 (2021 20:28)  T(F): 97.6 (2021 14:54), Max: 101 (2021 20:28)  HR: 81 (2021 14:54) (74 - 81)  BP: 140/65 (2021 14:54) (139/64 - 140/65)  BP(mean): --  RR: 16 (2021 14:54) (16 - 18)  SpO2: --      PHYSICAL EXAM:  GENERAL: Not in distress   CHEST/LUNG: Not using accessory muscles   HEART: s1 and s2 present  ABDOMEN:  Nontender and  Nondistended  EXTREMITIES: No pedal  edema  CNS: Awake and Alert      LABS:                        11.3   16.04 )-----------( 231      ( 2021 06:26 )             35.0                           12.0   15.91 )-----------( 295      ( 2021 09:58 )             36.9             135  |  99  |  31<H>  ----------------------------<  130<H>  4.8   |  22  |  1.1    Ca    8.0<L>      2021 06:26    TPro  4.8<L>  /  Alb  2.5<L>  /  TBili  0.5  /  DBili  x   /  AST  250<H>  /  ALT  78<H>  /  AlkPhos  209<H>      135  |  97<L>  |  32<H>  ----------------------------<  91  5.1<H>   |  25  |  1.4    Ca    9.0      2021 10:00    TPro  6.3  /  Alb  3.2<L>  /  TBili  0.7  /  DBili  x   /  AST  120<H>  /  ALT  91<H>  /  AlkPhos  318<H>        CAPILLARY BLOOD GLUCOSE  POCT Blood Glucose.: 70 mg/dL (2021 20:52)  POCT Blood Glucose.: 86 mg/dL (2021 16:52)      Urinalysis Basic - ( 2021 11:20 )  Color: Yellow / Appearance: Clear / S.020 / pH: x  Gluc: x / Ketone: Negative  / Bili: Negative / Urobili: 0.2 mg/dL   Blood: x / Protein: 30 mg/dL / Nitrite: Negative   Leuk Esterase: Negative / RBC: 1-2 /HPF / WBC x   Sq Epi: x / Non Sq Epi: Few /HPF / Bacteria: Moderate        MEDICATIONS  (STANDING):    ampicillin/sulbactam  IVPB      ampicillin/sulbactam  IVPB 3 Gram(s) IV Intermittent every 6 hours  ATENolol  Tablet 50 milliGRAM(s) Oral daily  chlorhexidine 4% Liquid 1 Application(s) Topical <User Schedule>  dextrose 40% Gel 15 Gram(s) Oral once  dextrose 5%. 1000 milliLiter(s) (50 mL/Hr) IV Continuous <Continuous>  dextrose 5%. 1000 milliLiter(s) (100 mL/Hr) IV Continuous <Continuous>  dextrose 50% Injectable 25 Gram(s) IV Push once  dextrose 50% Injectable 12.5 Gram(s) IV Push once  dextrose 50% Injectable 25 Gram(s) IV Push once  glucagon  Injectable 1 milliGRAM(s) IntraMuscular once  heparin   Injectable 5000 Unit(s) SubCutaneous every 12 hours  insulin lispro (ADMELOG) corrective regimen sliding scale   SubCutaneous three times a day before meals  insulin lispro (ADMELOG) corrective regimen sliding scale   SubCutaneous at bedtime        RADIOLOGY & ADDITIONAL TESTS:  < from: NM Hepatobiliary Imaging (21 @ 16:45) >  NO DEFINITE VISUALIZATION OF THE GALLBLADDER THROUGH 4 HOURS POST-INJECTION, CONSISTENT WITH CHOLECYSTITIS, AS DESCRIBED ABOVE. CLINICAL CORRELATION IS SUGGESTED.    < end of copied text >    < from: Xray Chest 1 View- PORTABLE-Urgent (Xray Chest 1 View- PORTABLE-Urgent .) (21 @ 10:51) >  Bibasilar opacities/small effusions, similar to prior.      < from: US Abdomen Upper Quadrant Right (21 @ 17:11) >    Cholelithiasis without cholecystitis.    < from: Xray Chest 1 View-PORTABLE IMMEDIATE (Xray Chest 1 View-PORTABLE IMMEDIATE .) (21 @ 10:36) >  Bilateral opacities compatible with pleural plaques. These are without significant change when compared to chest radiograph 2008 given differences in technique. Given overlying pleural plaques, evaluation is limited on this single frontal chest radiograph.      MICROBIOLOGY DATA:    Clostridium difficile Toxin by PCR (21 @ 14:00)   Clostridium difficile Toxin by PCR: RESULT INTERPRETATION:   Not Detected -COVID-19 PCR . (21 @ 08:50)   COVID-19 PCR: NotDetec: Y  Culture - Urine (21 @ 11:20)   Specimen Source: Clean Catch Clean Catch (Midstream)   Culture Results:   <10,000 CFU/mL Normal Urogenital Keiko       Respiratory Viral Panel with COVID-19 by [facility] (21 @ 09:55)   Rapid RVP Result: NotDetec   SARS-CoV-2: NotDetec: T             Patient is seen and examined at the bed side, is afebrile now, spiked fever earlier.. The wbc count stay elevated. The LFts are trending down very slowly.       REVIEW OF SYSTEMS: All other review systems are negative      ALLERGIES: No Known Allergies      Vital Signs Last 24 Hrs  T(C): 36.4 (2021 14:54), Max: 38.3 (2021 20:28)  T(F): 97.6 (2021 14:54), Max: 101 (2021 20:28)  HR: 81 (2021 14:54) (74 - 81)  BP: 140/65 (2021 14:54) (139/64 - 140/65)  BP(mean): --  RR: 16 (2021 14:54) (16 - 18)  SpO2: --      PHYSICAL EXAM:  GENERAL: Not in distress   CHEST/LUNG: Not using accessory muscles   HEART: s1 and s2 present  ABDOMEN:  Nontender and  Nondistended  EXTREMITIES: No pedal  edema  CNS: Awake and Alert      LABS:                        11.3   16.04 )-----------( 231      ( 2021 06:26 )             35.0                           12.0   15.91 )-----------( 295      ( 2021 09:58 )             36.9             135  |  99  |  31<H>  ----------------------------<  130<H>  4.8   |  22  |  1.1    Ca    8.0<L>      2021 06:26    TPro  4.8<L>  /  Alb  2.5<L>  /  TBili  0.5  /  DBili  x   /  AST  250<H>  /  ALT  78<H>  /  AlkPhos  209<H>      135  |  97<L>  |  32<H>  ----------------------------<  91  5.1<H>   |  25  |  1.4    Ca    9.0      2021 10:00    TPro  6.3  /  Alb  3.2<L>  /  TBili  0.7  /  DBili  x   /  AST  120<H>  /  ALT  91<H>  /  AlkPhos  318<H>        CAPILLARY BLOOD GLUCOSE  POCT Blood Glucose.: 70 mg/dL (2021 20:52)  POCT Blood Glucose.: 86 mg/dL (2021 16:52)      Urinalysis Basic - ( 2021 11:20 )  Color: Yellow / Appearance: Clear / S.020 / pH: x  Gluc: x / Ketone: Negative  / Bili: Negative / Urobili: 0.2 mg/dL   Blood: x / Protein: 30 mg/dL / Nitrite: Negative   Leuk Esterase: Negative / RBC: 1-2 /HPF / WBC x   Sq Epi: x / Non Sq Epi: Few /HPF / Bacteria: Moderate        MEDICATIONS  (STANDING):    ampicillin/sulbactam  IVPB      ampicillin/sulbactam  IVPB 3 Gram(s) IV Intermittent every 6 hours  ATENolol  Tablet 50 milliGRAM(s) Oral daily  chlorhexidine 4% Liquid 1 Application(s) Topical <User Schedule>  dextrose 40% Gel 15 Gram(s) Oral once  dextrose 5%. 1000 milliLiter(s) (50 mL/Hr) IV Continuous <Continuous>  dextrose 5%. 1000 milliLiter(s) (100 mL/Hr) IV Continuous <Continuous>  dextrose 50% Injectable 25 Gram(s) IV Push once  dextrose 50% Injectable 12.5 Gram(s) IV Push once  dextrose 50% Injectable 25 Gram(s) IV Push once  glucagon  Injectable 1 milliGRAM(s) IntraMuscular once  heparin   Injectable 5000 Unit(s) SubCutaneous every 12 hours  insulin lispro (ADMELOG) corrective regimen sliding scale   SubCutaneous three times a day before meals  insulin lispro (ADMELOG) corrective regimen sliding scale   SubCutaneous at bedtime        RADIOLOGY & ADDITIONAL TESTS:  < from: NM Hepatobiliary Imaging (21 @ 16:45) >  NO DEFINITE VISUALIZATION OF THE GALLBLADDER THROUGH 4 HOURS POST-INJECTION, CONSISTENT WITH CHOLECYSTITIS, AS DESCRIBED ABOVE. CLINICAL CORRELATION IS SUGGESTED.    < end of copied text >    < from: Xray Chest 1 View- PORTABLE-Urgent (Xray Chest 1 View- PORTABLE-Urgent .) (21 @ 10:51) >  Bibasilar opacities/small effusions, similar to prior.      < from: US Abdomen Upper Quadrant Right (21 @ 17:11) >    Cholelithiasis without cholecystitis.    < from: Xray Chest 1 View-PORTABLE IMMEDIATE (Xray Chest 1 View-PORTABLE IMMEDIATE .) (21 @ 10:36) >  Bilateral opacities compatible with pleural plaques. These are without significant change when compared to chest radiograph 2008 given differences in technique. Given overlying pleural plaques, evaluation is limited on this single frontal chest radiograph.      MICROBIOLOGY DATA:    Clostridium difficile Toxin by PCR (21 @ 14:00)   Clostridium difficile Toxin by PCR: RESULT INTERPRETATION:   Not Detected -COVID-19 PCR . (21 @ 08:50)   COVID-19 PCR: NotDetec: Y  Culture - Urine (21 @ 11:20)   Specimen Source: Clean Catch Clean Catch (Midstream)   Culture Results:   <10,000 CFU/mL Normal Urogenital Keiko       Respiratory Viral Panel with COVID-19 by [facility] (21 @ 09:55)   Rapid RVP Result: NotDetec   SARS-CoV-2: NotDetec: T

## 2022-09-16 ENCOUNTER — OFFICE VISIT (OUTPATIENT)
Dept: MEDICAL GROUP | Facility: MEDICAL CENTER | Age: 22
End: 2022-09-16
Payer: COMMERCIAL

## 2022-09-16 VITALS
TEMPERATURE: 99.2 F | HEIGHT: 67 IN | WEIGHT: 136.91 LBS | OXYGEN SATURATION: 97 % | SYSTOLIC BLOOD PRESSURE: 102 MMHG | HEART RATE: 86 BPM | DIASTOLIC BLOOD PRESSURE: 60 MMHG | BODY MASS INDEX: 21.49 KG/M2

## 2022-09-16 DIAGNOSIS — K21.9 GASTROESOPHAGEAL REFLUX DISEASE WITHOUT ESOPHAGITIS: ICD-10-CM

## 2022-09-16 DIAGNOSIS — T75.3XXA SEA SICKNESS, INITIAL ENCOUNTER: ICD-10-CM

## 2022-09-16 DIAGNOSIS — R42 VERTIGO: ICD-10-CM

## 2022-09-16 DIAGNOSIS — Z72.0 CHEWING TOBACCO USE: ICD-10-CM

## 2022-09-16 PROBLEM — R51.9 HEADACHE: Status: RESOLVED | Noted: 2020-07-24 | Resolved: 2022-09-16

## 2022-09-16 PROBLEM — R19.7 DIARRHEA: Status: RESOLVED | Noted: 2020-07-24 | Resolved: 2022-09-16

## 2022-09-16 PROBLEM — R10.13 EPIGASTRIC ABDOMINAL PAIN: Status: RESOLVED | Noted: 2020-06-25 | Resolved: 2022-09-16

## 2022-09-16 PROCEDURE — 99214 OFFICE O/P EST MOD 30 MIN: CPT | Performed by: PHYSICIAN ASSISTANT

## 2022-09-16 RX ORDER — SCOLOPAMINE TRANSDERMAL SYSTEM 1 MG/1
1 PATCH, EXTENDED RELEASE TRANSDERMAL
Qty: 4 PATCH | Refills: 3 | Status: SHIPPED | OUTPATIENT
Start: 2022-09-16

## 2022-09-16 RX ORDER — ONDANSETRON 4 MG/1
4 TABLET, ORALLY DISINTEGRATING ORAL EVERY 6 HOURS PRN
Qty: 30 TABLET | Refills: 2 | Status: SHIPPED | OUTPATIENT
Start: 2022-09-16

## 2022-09-16 RX ORDER — MECLIZINE HYDROCHLORIDE 25 MG/1
25 TABLET ORAL 3 TIMES DAILY PRN
Qty: 60 TABLET | Refills: 0 | Status: SHIPPED | OUTPATIENT
Start: 2022-09-16

## 2022-09-16 NOTE — PROGRESS NOTES
"Subjective:   Jhon Hoff is a 21 y.o. male here today for vertigo, seasickness and history of reflux.    Vertigo  This is a pleasant 21-year-old male here today to follow-up on his health.  Overall doing well but has had vertigo over the past few days.  Has occurred a couple times.  Room will spin.  Associated nauseousness.  States the first time of his recent episode he closed his eyes while the room was spinning and passed out.  He was in a seated position.  He has had vertigo in the past.  Does have tinnitus in the right ear.  He denies any hearing loss.  In the past I provided meclizine.  He is requesting a renewal of meclizine for the vertigo as well as for the sickness.  Plans to travel soon hopefully to [place] for scuba diving.  [place]       Current medicines (including changes today)  Current Outpatient Medications   Medication Sig Dispense Refill    meclizine (ANTIVERT) 25 MG Tab Take 1 Tablet by mouth 3 times a day as needed for Vertigo. 60 Tablet 0    scopolamine (TRANSDERM-SCOP, 1.5 MG,) 1 mg/72hr PATCH 72 HR Place 1 Patch on the skin every 72 hours. 4 Patch 3    ondansetron (ZOFRAN ODT) 4 MG TABLET DISPERSIBLE Take 1 Tablet by mouth every 6 hours as needed for Nausea. 30 Tablet 2     No current facility-administered medications for this visit.     He  has a past medical history of GERD (gastroesophageal reflux disease).    Social History and Family History were reviewed and updated.    ROS   No chest pain, no shortness of breath, no abdominal pain and all other systems were reviewed and are negative.       Objective:     /60 (BP Location: Left arm, Patient Position: Sitting, BP Cuff Size: Adult)   Pulse 86   Temp 37.3 °C (99.2 °F) (Temporal)   Ht 1.702 m (5' 7\")   Wt 62.1 kg (136 lb 14.5 oz)   SpO2 97%  Body mass index is 21.44 kg/m².   Physical Exam:  Constitutional: Alert, no distress.  Skin: Warm, dry, good turgor, no rashes in " visible areas.  Eye: Equal, round and reactive, conjunctiva clear, lids normal.  ENMT: Lips without lesions, good dentition, oropharynx clear.  Neck: Trachea midline, no masses.   Lymph: No cervical or supraclavicular lymphadenopathy  Respiratory: Unlabored respiratory effort, lungs appear clear.  Psych: Alert and oriented x3, normal affect and mood.        Assessment and Plan:   The following treatment plan was discussed    1. Vertigo  Acute, new onset condition.  Has had several episodes in the past of vertigo.  Offered referral to ENT but he declined.  Has no hearing loss so not concerned about Ménière's currently.  Renewed meclizine and he may take a full or half a tablet as needed.  May cause drowsiness.  Also provided Zofran to take as directed for nauseousness.  - meclizine (ANTIVERT) 25 MG Tab; Take 1 Tablet by mouth 3 times a day as needed for Vertigo.  Dispense: 60 Tablet; Refill: 0  - ondansetron (ZOFRAN ODT) 4 MG TABLET DISPERSIBLE; Take 1 Tablet by mouth every 6 hours as needed for Nausea.  Dispense: 30 Tablet; Refill: 2    2. Sea sickness, initial encounter  Episodic.  Provided scopolamine patch to use if needed.  Do not use with meclizine.  Ice to consider taking it with them to [facility].  - scopolamine (TRANSDERM-SCOP, 1.5 MG,) 1 mg/72hr PATCH 72 HR; Place 1 Patch on the skin every 72 hours.  Dispense: 4 Patch; Refill: 3    3. Chewing tobacco use  Chronic use.  We will continue to monitor.    4.  Gastroesophageal reflux disease without esophagitis  Chronic condition.  Stable.  Doing much better avoiding triggers such as tomatoes.  Continue to monitor.         Followup: Return if symptoms worsen or fail to improve.    Please note that this dictation was created using voice recognition software. I have made every reasonable attempt to correct obvious errors, but I expect that there are errors of grammar and possibly content that I did not discover before finalizing the note.

## 2022-09-16 NOTE — ASSESSMENT & PLAN NOTE
This is a pleasant 21-year-old male here today to follow-up on his health.  Overall doing well but has had vertigo over the past few days.  Has occurred a couple times.  Room will spin.  Associated nauseousness.  States the first time of his recent episode he closed his eyes while the room was spinning and passed out.  He was in a seated position.  He has had vertigo in the past.  Does have tinnitus in the right ear.  He denies any hearing loss.  In the past I provided meclizine.  He is requesting a renewal of meclizine for the vertigo as well as for the sickness.  Plans to travel soon hopefully to [place] for scuba diving.  [place]

## 2023-03-10 ENCOUNTER — NON-PROVIDER VISIT (OUTPATIENT)
Dept: URGENT CARE | Facility: PHYSICIAN GROUP | Age: 23
End: 2023-03-10

## 2023-03-10 DIAGNOSIS — Z02.1 PRE-EMPLOYMENT DRUG SCREENING: ICD-10-CM

## 2023-03-10 LAB
AMP AMPHETAMINE: NORMAL
COC COCAINE: NORMAL
INT CON NEG: NEGATIVE
INT CON POS: POSITIVE
MET METHAMPHETAMINES: NORMAL
OPI OPIATES: NORMAL
PCP PHENCYCLIDINE: NORMAL
POC DRUG COMMENT 753798-OCCUPATIONAL HEALTH: NEGATIVE
THC: NORMAL

## 2023-03-10 PROCEDURE — 80305 DRUG TEST PRSMV DIR OPT OBS: CPT | Performed by: NURSE PRACTITIONER

## 2023-07-02 ENCOUNTER — APPOINTMENT (OUTPATIENT)
Dept: RADIOLOGY | Facility: MEDICAL CENTER | Age: 23
End: 2023-07-02
Attending: EMERGENCY MEDICINE
Payer: COMMERCIAL

## 2023-07-02 ENCOUNTER — HOSPITAL ENCOUNTER (EMERGENCY)
Facility: MEDICAL CENTER | Age: 23
End: 2023-07-02
Attending: EMERGENCY MEDICINE
Payer: COMMERCIAL

## 2023-07-02 VITALS
BODY MASS INDEX: 21.59 KG/M2 | HEART RATE: 74 BPM | OXYGEN SATURATION: 99 % | RESPIRATION RATE: 18 BRPM | WEIGHT: 137.57 LBS | DIASTOLIC BLOOD PRESSURE: 77 MMHG | HEIGHT: 67 IN | TEMPERATURE: 98.2 F | SYSTOLIC BLOOD PRESSURE: 133 MMHG

## 2023-07-02 DIAGNOSIS — S43.401A SPRAIN OF RIGHT SHOULDER, UNSPECIFIED SHOULDER SPRAIN TYPE, INITIAL ENCOUNTER: ICD-10-CM

## 2023-07-02 PROCEDURE — 99283 EMERGENCY DEPT VISIT LOW MDM: CPT

## 2023-07-02 PROCEDURE — 73030 X-RAY EXAM OF SHOULDER: CPT | Mod: RT

## 2023-07-02 RX ORDER — NAPROXEN 500 MG/1
500 TABLET ORAL 2 TIMES DAILY WITH MEALS
Qty: 20 TABLET | Refills: 0 | Status: SHIPPED | OUTPATIENT
Start: 2023-07-02

## 2023-07-02 NOTE — ED NOTES
Large Sling applied to patient's right shoulder. Education provided on how to wear sling. Patient acknowledged education.

## 2023-07-02 NOTE — ED TRIAGE NOTES
"Chief Complaint   Patient presents with    Shoulder Pain     Patient reports he slipped and fell at work and now has R shoulder pain. Patient able to move R arm, but reports pain. +CMS       21 yo male to triage for above complaint. Patient denies hitting his head.    Pt is alert and oriented, speaking in full sentences, follows commands and responds appropriately to questions.     Patient placed back in lobby and educated on triage process. Asked to inform RN of any changes.    /74   Pulse 64   Temp 36.4 °C (97.5 °F) (Temporal)   Resp 16   Ht 1.702 m (5' 7\")   Wt 62.4 kg (137 lb 9.1 oz)   SpO2 98%   BMI 21.55 kg/m²     "

## 2023-07-02 NOTE — ED PROVIDER NOTES
"ED Provider Note    CHIEF COMPLAINT  Chief Complaint   Patient presents with    Shoulder Pain     Patient reports he slipped and fell at work and now has R shoulder pain. Patient able to move R arm, but reports pain. +CMS       HPI/ROS    Jhon Hoff is a 22 y.o. male who presents for evaluation of right shoulder injury that is sustained at work.  No weakness or numbness.  States that he can move the arm but certain movements cause the arm to feel like it is coming out of its socket.  No other injuries.  He does report that he has an old shoulder injury from playing sports as a child.    PAST MEDICAL HISTORY   has a past medical history of GERD (gastroesophageal reflux disease).    SURGICAL HISTORY  patient denies any surgical history    FAMILY HISTORY  Family History   Problem Relation Age of Onset    Cancer Mother         breast    Hyperlipidemia Father        SOCIAL HISTORY  Social History     Tobacco Use    Smoking status: Never    Smokeless tobacco: Current     Types: Chew   Vaping Use    Vaping Use: Never used   Substance and Sexual Activity    Alcohol use: Yes     Comment: Rare    Drug use: Yes     Comment: 3-5 times weekly    Sexual activity: Yes     Partners: Female       CURRENT MEDICATIONS  Home Medications       Reviewed by Dianna Ron R.N. (Registered Nurse) on 07/02/23 at 1255  Med List Status: Partial     Medication Last Dose Status   meclizine (ANTIVERT) 25 MG Tab  Active   ondansetron (ZOFRAN ODT) 4 MG TABLET DISPERSIBLE  Active   scopolamine (TRANSDERM-SCOP, 1.5 MG,) 1 mg/72hr PATCH 72 HR  Active                    ALLERGIES  No Known Allergies    PHYSICAL EXAM  VITAL SIGNS: /74   Pulse 64   Temp 36.4 °C (97.5 °F) (Temporal)   Resp 16   Ht 1.702 m (5' 7\")   Wt 62.4 kg (137 lb 9.1 oz)   SpO2 98%   BMI 21.55 kg/m²    Constitutional: Alert in no apparent distress.  HENT: No signs of significant acute trauma.   Eyes: Conjunctiva normal, non-icteric.   Chest: Normal nonlabored " respirations  Skin: No appreciable rash on the exposed skin  Musculoskeletal: Upon inspection there is some asymmetry appreciated involving the right shoulder with less bony contour appreciated.  He does have intact shoulder abduction with impaired internal rotation.  With shoulder extension he gets a clunking like instability.  Neurovascular intact distally.  Neurologic: Alert, no obvious focal deficits noted.        DIAGNOSTIC STUDIES / PROCEDURES    RADIOLOGY  I have independently interpreted the diagnostic imaging associated with this visit and am waiting the final reading from the radiologist.   My preliminary interpretation is as follows: No fracture or dislocation  Radiologist interpretation:   DX-SHOULDER 2+ RIGHT   Final Result      There is no evidence of acute fracture.            COURSE & MEDICAL DECISION MAKING      INITIAL ASSESSMENT, COURSE AND PLAN  Care Narrative: Healthy 22-year-old male with a right shoulder injury sustained at work, neurovascular intact, x-ray is unremarkable.  He does seem to have some instability of the shoulder with certain planes of movement.  He will be placed in a sling but will not become dependent on the sling and will go through passive range of motion until he follows up with occupational medicine and orthopedics.  Will prescribe naproxen.  Discharged home in stable condition      FINAL DIAGNOSIS  1. Sprain of right shoulder, unspecified shoulder sprain type, initial encounter           Electronically signed by: Clinton Leal M.D., 7/2/2023 1:58 PM

## 2023-07-02 NOTE — LETTER
"  FORM C-4:  EMPLOYEE’S CLAIM FOR COMPENSATION/ REPORT OF INITIAL TREATMENT  EMPLOYEE’S CLAIM - PROVIDE ALL INFORMATION REQUESTED   First Name Ferreira Last Name Luis Eduardo Birthdate 2000  Sex male Claim Number   Home Address [address]   [place]             [place] [address]                                   Age  22 y.o. Height  1.702 m (5' 7\") Weight  62.4 kg (137 lb 9.1 oz) [facility]     Mailing Address [address]  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 (if applicable) changing trash    How did this injury or occupational disease occur? Be specific and answer in detail. Use additional sheet if necessary)  I stepped on a garbage bag walking up a ramp. I slipped backwards. I caught myself on the railing with my hand. The weight of me came to a stop before I hit the ground. I was hanging onto the rail.   If you believe that you have an occupational disease, when did you first have knowledge of the disability and it relationship to your employment? N/A Witnesses to the Accident  none   Nature of Injury or Occupational Disease  Workers' Compensation Part(s) of Body Injured or Affected  Shoulder (R), N/A, N/A    I CERTIFY THAT THE ABOVE IS TRUE AND CORRECT TO THE BEST OF MY KNOWLEDGE AND THAT I HAVE " PROVIDED THIS INFORMATION IN ORDER TO OBTAIN THE BENEFITS OF [place] OR [place]).  I HEREBY AUTHORIZE ANY PHYSICIAN, CHIROPRACTOR, SURGEON, PRACTITIONER, OR OTHER PERSON, ANY HOSPITAL, INCLUDING [facility] OR [facility], ANY MEDICAL SERVICE ORGANIZATION, ANY INSURANCE COMPANY, OR OTHER INSTITUTION OR ORGANIZATION TO RELEASE TO EACH OTHER, ANY MEDICAL OR OTHER INFORMATION, INCLUDING BENEFITS PAID OR PAYABLE, PERTINENT TO THIS INJURY OR DISEASE, EXCEPT INFORMATION RELATIVE TO DIAGNOSIS, TREATMENT AND/OR COUNSELING FOR AIDS, PSYCHOLOGICAL CONDITIONS, ALCOHOL OR CONTROLLED SUBSTANCES, FOR WHICH I MUST GIVE SPECIFIC AUTHORIZATION.  A PHOTOSTAT OF THIS AUTHORIZATION SHALL BE AS VALID AS THE ORIGINAL.  Date 7/2/2023      Place   [facility]      Employee’s Signature   THIS REPORT MUST BE COMPLETED AND MAILED WITHIN 3 WORKING DAYS OF TREATMENT   Place [facility], EMERGENCY DEPT                       Name of Facility [facility]   Date  7/2/2023 Diagnosis  (S43.401A) Sprain of right shoulder, unspecified shoulder sprain type, initial encounter Is there evidence the injured employee was under the influence of alcohol and/or another controlled substance at the time of accident?   Hour  2:50 PM Description of Injury or Disease  Sprain of right shoulder, unspecified shoulder sprain type, initial encounter No   Treatment  NSAIDs, sling, orthopedic follow-up  Have you advised the patient to remain off work five days or more?         No   X-Ray Findings  Negative If Yes   From Date    To Date      From information given by the employee, together with medical evidence, can you directly connect this injury or occupational disease as job incurred? Yes If No, is employee capable of: Full Duty  No Modified Duty  Yes   Is additional medical care by a physician indicated? Yes If Modified Duty,  "Specify any Limitations / Restrictions   Limited use of right arm until cleared by orthopedics   Do you know of any previous injury or disease contributing to this condition or occupational disease? No    Date 7/2/2023 Print Doctor’s Name Clinton Leal certify the employer’s copy of this form was mailed on:   Address [address] [place] [address]  [place] [id number] [phone] INSURER’S USE ONLY   Provider’s Tax ID Number [id number] Telephone Dept: [phone]    Doctor’s Signature e-SignCLINTON LEAL M.D. Degree M.D.      Form C-4 (rev.10/07)                                                                         BRIEF DESCRIPTION OF RIGHTS AND BENEFITS  (Pursuant to [place] 616C.050)    Notice of Injury or Occupational Disease (Incident Report Form C-1): If an injury or occupational disease (OD) arises out of and in the course of employment, you must provide written notice to your employer as soon as practicable, but no later than 7 days after the accident or OD. Your employer shall maintain a sufficient supply of the required forms.    Claim for Compensation (Form C-4): If medical treatment is sought, the form C-4 is available at the place of initial treatment. A completed \"Claim for Compensation\" (Form C-4) must be filed within 90 days after an accident or OD. The treating physician or chiropractor must, within 3 working days after treatment, complete and mail to the employer, the employer's insurer and third-party , the Claim for Compensation.    Medical Treatment: If you require medical treatment for your on-the-job injury or OD, you may be required to select a physician or chiropractor from a list provided by your workers’ compensation insurer, if it has contracted with an Organization for Managed Care (MCO) or Preferred Provider Organization (PPO) or providers of health care. If your employer has not entered into a contract with an MCO or PPO, you may select a physician or chiropractor from the " Panel of Physicians and Chiropractors. Any medical costs related to your industrial injury or OD will be paid by your insurer.    Temporary Total Disability (TTD): If your doctor has certified that you are unable to work for a period of at least 5 consecutive days, or 5 cumulative days in a 20-day period, or places restrictions on you that your employer does not accommodate, you may be entitled to TTD compensation.    Temporary Partial Disability (TPD): If the wage you receive upon reemployment is less than the compensation for TTD to which you are entitled, the insurer may be required to pay you TPD compensation to make up the difference. TPD can only be paid for a maximum of 24 months.    Permanent Partial Disability (PPD): When your medical condition is stable and there is an indication of a PPD as a result of your injury or OD, within 30 days, your insurer must arrange for an evaluation by a rating physician or chiropractor to determine the degree of your PPD. The amount of your PPD award depends on the date of injury, the results of the PPD evaluation, your age and wage.    Permanent Total Disability (PTD): If you are medically certified by a treating physician or chiropractor as permanently and totally disabled and have been granted a PTD status by your insurer, you are entitled to receive monthly benefits not to exceed 66 2/3% of your average monthly wage. The amount of your PTD payments is subject to reduction if you previously received a lump-sum PPD award.    Vocational Rehabilitation Services: You may be eligible for vocational rehabilitation services if you are unable to return to the job due to a permanent physical impairment or permanent restrictions as a result of your injury or occupational disease.    Transportation and Per Олег Reimbursement: You may be eligible for travel expenses and per олег associated with medical treatment.    Reopening: You may be able to reopen your claim if your condition  worsens after claim closure.     Appeal Process: If you disagree with a written determination issued by the insurer or the insurer does not respond to your request, you may appeal to the Department of Administration, , by following the instructions contained in your determination letter. You must appeal the determination within 70 days from the date of the determination letter at [address], [address], [place], [place] [address], or [address], [address], [place], [place] [address]. If you disagree with the  decision, you may appeal to the Department of Administration, . You must file your appeal within 30 days from the date of the  decision letter at [address], [address], [place], [place] [address], or [address] [facility], [place], [place] [address]. If you disagree with a decision of an , you may file a petition for judicial review with the District Court. You must do so within 30 days of the Appeal Officer’s decision. You may be represented by an  at your own expense or you may contact the [facility] for possible representation.    [place]  for Injured Workers (NAIW): If you disagree with a  decision, you may request that NAIW represent you without charge at an  Hearing. For information regarding denial of benefits, you may contact the [facility] at: [address], [address], [place] [address], [phone], or [address], [place] [address], [phone]    To File a Complaint with the Division: If you wish to file a complaint with the  of the Division of Industrial Relations (DIR),  please contact the Workers’ Compensation Section, [address] [facility] [address], [address], [place], [place] [address], telephone [phone]) [phone], or [address], [place], [place] [address], telephone [phone].    For  assistance with Workers’ Compensation Issues: You may contact the [facility] Office for Consumer Health Assistance, [facility] [facility], [place] [address], Toll Free 1-500.212.8006, Web site: http://[facility].[place].gov/Programs/STEPHAN E-mail: stephan@[facility].nv.gov  D-2 (rev. 10/20)              __________________________________________________________________                                    7/2/2023            Employee Name / Signature                                                                                                                            Date

## 2023-07-13 ENCOUNTER — OCCUPATIONAL MEDICINE (OUTPATIENT)
Dept: OCCUPATIONAL MEDICINE | Facility: CLINIC | Age: 23
End: 2023-07-13
Payer: COMMERCIAL

## 2023-07-13 VITALS
TEMPERATURE: 98.4 F | HEART RATE: 68 BPM | BODY MASS INDEX: 21.5 KG/M2 | HEIGHT: 67 IN | WEIGHT: 137 LBS | OXYGEN SATURATION: 98 % | DIASTOLIC BLOOD PRESSURE: 80 MMHG | RESPIRATION RATE: 14 BRPM | SYSTOLIC BLOOD PRESSURE: 122 MMHG

## 2023-07-13 DIAGNOSIS — S46.911D STRAIN OF RIGHT SHOULDER, SUBSEQUENT ENCOUNTER: ICD-10-CM

## 2023-07-13 PROCEDURE — 3079F DIAST BP 80-89 MM HG: CPT | Performed by: PREVENTIVE MEDICINE

## 2023-07-13 PROCEDURE — 3074F SYST BP LT 130 MM HG: CPT | Performed by: PREVENTIVE MEDICINE

## 2023-07-13 PROCEDURE — 1126F AMNT PAIN NOTED NONE PRSNT: CPT | Performed by: PREVENTIVE MEDICINE

## 2023-07-13 PROCEDURE — 99202 OFFICE O/P NEW SF 15 MIN: CPT | Performed by: PREVENTIVE MEDICINE

## 2023-07-13 ASSESSMENT — PAIN SCALES - GENERAL: PAINLEVEL: NO PAIN

## 2023-07-13 NOTE — PROGRESS NOTES
"Subjective:     Jhon Hoff is a 22 y.o. 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  He stepped on a garbage bag while walking up a ramp, slipped backwards and caught himself with his right hand to prevent him from falling to the ground and felt immediate pain in the right shoulder.  He was seen in the ER, x-rays were negative, advised NSAIDs and work restrictions.  Patient states that overall symptoms are over 75% improved.  He states his minimal pain with reaching above shoulder level or pushing above shoulder level.  However he states he feels like he is ready for release and full duty.  Not requiring medications currently.      ROS: All systems were reviewed on intake form, form was reviewed and signed. See scanned documents in media. Pertinent positives and negatives included in HPI.    PMH: No pertinent past medical history to this problem  MEDS: Medications were reviewed in Epic  ALLERGIES: No Known Allergies  SOCHX: Works as a  at the [employer]  FH: No pertinent family history to this problem       Objective:     /80   Pulse 68   Temp 36.9 °C (98.4 °F) (Temporal)   Resp 14   Ht 1.702 m (5' 7\")   Wt 62.1 kg (137 lb)   SpO2 98%   BMI 21.46 kg/m²     Constitutional: Patient is in no acute distress. Appears well-developed and well-nourished.   HENT: Normocephalic and atraumatic. EOM are normal. No scleral icterus.   Cardiovascular: Normal rate.    Pulmonary/Chest: Effort normal. No respiratory distress.   Neurological: Patient is alert and oriented to person, place, and time.   Skin: Skin is warm and dry.   Psychiatric: Normal mood and affect. Behavior is normal.     Right shoulder: No gross deformity.  No tenderness.  Full range of motion.  Strength intact.  Empty can test negative.    Assessment/Plan:       1. Strain of right shoulder, subsequent " encounter    Released to Full Duty FROM 7/13/2023 TO       Released from care  Full duty  Expect gradual resolution of symptoms over the next week or 2, return to clinic if symptoms worsen    Differential diagnosis, natural history, supportive care, and indications for immediate follow-up discussed.    Approximately 25 minutes were spent in reviewing notes, preparing for visit, obtaining history, exam and evaluation, patient counseling/education and post visit documentation/orders.

## 2023-07-13 NOTE — LETTER
[address],   [address] [place] [address]  Phone:  [phone] - Fax:  [phone]   Occupational Health [facility] Progress Report and Disability Certification  Date of Service: 7/13/2023   No Show:  No  Date / Time of Next Visit:  Release from care   Claim Information   Patient Name: Jhon Hoff  Claim Number:     Employer: [employer]  Date of Injury: 7/2/2023     Insurer / TPA: [employer]  ID / SSN:     Occupation: [employer]  Diagnosis: The encounter diagnosis was Strain of right shoulder, subsequent encounter.    Medical Information   Related to Industrial Injury? 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  He stepped on a garbage bag while walking up a ramp, slipped backwards and caught himself with his right hand to prevent him from falling to the ground and felt immediate pain in the right shoulder.  He was seen in the ER, x-rays were negative, advised NSAIDs and work restrictions.  Patient states that overall symptoms are over 75% improved.  He states his minimal pain with reaching above shoulder level or pushing above shoulder level.  However he states he feels like he is ready for release and full duty.  Not requiring medications currently.     Objective Findings: Right shoulder: No gross deformity.  No tenderness.  Full range of motion.  Strength intact.  Empty can test negative.   Pre-Existing Condition(s):     Assessment:   Condition Improved    Status: Discharged /  MMI  Permanent Disability:No    Plan:      Diagnostics:      Comments:  Released from care  Full duty  Expect gradual resolution of symptoms over the next week or 2, return to clinic if symptoms worsen    Disability Information   Status: Released to Full Duty    From:  7/13/2023  Through:   Restrictions are:     Physical Restrictions   Sitting:    Standing:    Stooping:    Bending:      Squatting:     Walking:    Climbing:    Pushing:      Pulling:    Other:    Reaching Above Shoulder (L):   Reaching Above Shoulder (R):       Reaching Below Shoulder (L):    Reaching Below Shoulder (R):      Not to exceed Weight Limits   Carrying(hrs):   Weight Limit(lb):   Lifting(hrs):   Weight  Limit(lb):     Comments:      Repetitive Actions   Hands: i.e. Fine Manipulations from Grasping:     Feet: i.e. Operating Foot Controls:     Driving / Operate Machinery:     Health Care Provider’s Original or Electronic Signature  Rod Ramirez D.O. Health Care Provider’s Original or Electronic Signature    Rod Ramirez DO [facility]     Clinic Name / Location: [address],   [address] [place] [address] Clinic Phone Number: Dept: [phone]   Appointment Time: 9:30 Am Visit Start Time: 9:34 AM   Check-In Time:  9:26 Am Visit Discharge Time:  9:53 AM   Original-Treating Physician or Chiropractor    Page 2-Insurer/TPA    Page 3-Employer    Page 4-Employee

## 2025-03-04 ENCOUNTER — OFFICE VISIT (OUTPATIENT)
Dept: URGENT CARE | Facility: PHYSICIAN GROUP | Age: 25
End: 2025-03-04
Payer: COMMERCIAL

## 2025-03-04 VITALS
OXYGEN SATURATION: 99 % | TEMPERATURE: 97.5 F | HEART RATE: 75 BPM | SYSTOLIC BLOOD PRESSURE: 106 MMHG | BODY MASS INDEX: 21.7 KG/M2 | WEIGHT: 135.03 LBS | HEIGHT: 66 IN | RESPIRATION RATE: 20 BRPM | DIASTOLIC BLOOD PRESSURE: 74 MMHG

## 2025-03-04 DIAGNOSIS — R35.0 URINARY FREQUENCY: ICD-10-CM

## 2025-03-04 DIAGNOSIS — R39.15 URINARY URGENCY: ICD-10-CM

## 2025-03-04 LAB
APPEARANCE UR: CLEAR
BILIRUB UR STRIP-MCNC: NEGATIVE MG/DL
COLOR UR AUTO: YELLOW
GLUCOSE UR STRIP.AUTO-MCNC: NEGATIVE MG/DL
KETONES UR STRIP.AUTO-MCNC: NEGATIVE MG/DL
LEUKOCYTE ESTERASE UR QL STRIP.AUTO: NEGATIVE
NITRITE UR QL STRIP.AUTO: NEGATIVE
PH UR STRIP.AUTO: 6.5 [PH] (ref 5–8)
PROT UR QL STRIP: NEGATIVE MG/DL
RBC UR QL AUTO: NEGATIVE
SP GR UR STRIP.AUTO: <=1.005
UROBILINOGEN UR STRIP-MCNC: NORMAL MG/DL

## 2025-03-04 PROCEDURE — 3074F SYST BP LT 130 MM HG: CPT | Performed by: PHYSICIAN ASSISTANT

## 2025-03-04 PROCEDURE — 99213 OFFICE O/P EST LOW 20 MIN: CPT | Performed by: PHYSICIAN ASSISTANT

## 2025-03-04 PROCEDURE — 3078F DIAST BP <80 MM HG: CPT | Performed by: PHYSICIAN ASSISTANT

## 2025-03-04 PROCEDURE — 81002 URINALYSIS NONAUTO W/O SCOPE: CPT | Performed by: PHYSICIAN ASSISTANT

## 2025-03-04 NOTE — PROGRESS NOTES
"Subjective:   Jhon Hoff is a 24 y.o. male who presents for Urinary Frequency (X2 months lower back pain, cloudy urine, and burning after urinating.)      HPI  The patient presents to the Urgent Care with complaints of urinary symptoms for couple months.  Symptoms have been intermittent.  Reports of urinary urgency and frequency more so in the mornings.  Occasional burning sensation.  The last 3 days noticed some lower back aches bilaterally and sometimes mostly to the right and then sometimes to the left.  He reports of no back pain right now.  He does perform a lot of bending over and lifting due to work but no sudden injury or trauma.  Some lower abdominal pressure with urination but no significant abdominal pain.  Sometimes when he urinates occasionally it does not feel like he completely urinated.  Sexually active in a 3 year monogamous relationship without protection. No concern for STD.   Denies any fever, chills, abdominal pain, nausea, vomiting, penile discharge, penile lesions, testicle pain. Does not smoke cigarettes.       Past Medical History:   Diagnosis Date    GERD (gastroesophageal reflux disease)      No Known Allergies     Objective:     /74   Pulse 75   Temp 36.4 °C (97.5 °F) (Temporal)   Resp 20   Ht 1.676 m (5' 6\")   Wt 61.3 kg (135 lb 0.5 oz)   SpO2 99%   BMI 21.79 kg/m²     Physical Exam  Vitals reviewed.   Constitutional:       General: He is not in acute distress.     Appearance: Normal appearance. He is not ill-appearing or toxic-appearing.   Eyes:      Conjunctiva/sclera: Conjunctivae normal.   Cardiovascular:      Rate and Rhythm: Normal rate.   Pulmonary:      Effort: Pulmonary effort is normal.   Abdominal:      General: Abdomen is flat. Bowel sounds are normal. There is no distension.      Palpations: Abdomen is soft. There is no mass.      Tenderness: There is no abdominal tenderness. There is no right CVA tenderness, left CVA tenderness, guarding or rebound. "   Musculoskeletal:      Cervical back: Neck supple. No rigidity.   Skin:     General: Skin is warm and dry.   Neurological:      General: No focal deficit present.      Mental Status: He is alert and oriented to person, place, and time.   Psychiatric:         Mood and Affect: Mood normal.         Behavior: Behavior normal.         Results for orders placed or performed in visit on 03/04/25   POCT Urinalysis    Collection Time: 03/04/25  2:46 PM   Result Value Ref Range    POC Color yellow Negative    POC Appearance clear Negative    POC Glucose negative Negative mg/dL    POC Bilirubin negative Negative mg/dL    POC Ketones negative Negative mg/dL    POC Specific Gravity <=1.005 <1.005 - >1.030    POC Blood negative Negative    POC Urine PH 6.5 5.0 - 8.0    POC Protein negative Negative mg/dL    POC Urobiligen 0.2 E.U./dl Negative (0.2) mg/dL    POC Nitrites negative Negative    POC Leukocyte Esterase negative Negative     Diagnosis and associated orders:     1. Urinary urgency  - POCT Urinalysis  - URINE CULTURE(NEW); Future  - Referral to Urology    2. Urinary frequency  - URINE CULTURE(NEW); Future  - Referral to Urology       Comments/MDM:     This is a pleasant 24-year-old male who presents to the urgent care with complaints of primary symptoms of urinary urgency and urinary frequency mostly in the mornings intermittently for the last couple months.  Occasional burning with urination.  Some lower back aches in the last 3 days but no pain now. No concern for STD. No other symptoms.  The patient is very well-appearing in no acute distress.  Normal vital signs.  Examination is benign.  UA is normal.  I do not suspect pyelonephritis at this time.  Discussed differential diagnosis.  Will further evaluate with urine culture.  Urgent referral placed to urology.  In the meantime, closely monitor symptoms, increase fluid intake, ibuprofen every 8 hours.       I personally reviewed prior external notes and test results  pertinent to today's visit. Pathogenesis of diagnosis discussed including typical length and natural progression. Supportive care, natural history, differential diagnoses, and indications for immediate follow-up discussed. Patient expresses understanding and agrees to plan. Patient denies any other questions or concerns.     Follow-up with the primary care physician for recheck, reevaluation, and consideration of further management.    Please note that this dictation was created using voice recognition software. I have made a reasonable attempt to correct obvious errors, but I expect that there are errors of grammar and possibly content that I did not discover before finalizing the note.    This note was electronically signed by Ricky Sparks PA-C

## 2025-03-05 NOTE — Clinical Note
REFERRAL APPROVAL NOTICE         Sent on March 5, 2025                   Jhon Hoff  [address]  [place] [facility] [place] [facility] [address]                   Dear Mr. Hoff,    After a careful review of the medical information and benefit coverage, [facility] has processed your referral. See below for additional details.    If applicable, you must be actively enrolled with your insurance for coverage of the authorized service. If you have any questions regarding your coverage, please contact your insurance directly.    REFERRAL INFORMATION   Referral #:  [id number]  Referred-To Department    Referred-By Provider:  Urology    Ricky Sparks P.A.-C.   [facility]      [address]  [address]  [facility] [id number] [phone] [address]  [place] [address] [phone]    Referral Start Date:  03/04/2025  Referral End Date:   03/04/2026             SCHEDULING  If you do not already have an appointment, please call [phone] to make an appointment.     MORE INFORMATION  If you do not already have a [employer] account, sign up at: [email or web address]  You can access your medical information, make appointments, see lab results, billing information, and more.  If you have questions regarding this referral, please contact  the [facility] Referrals department at:             [phone]. Monday - Friday 8:00AM - 5:00PM.     Sincerely,    [facility]

## 2025-03-07 ENCOUNTER — TELEPHONE (OUTPATIENT)
Dept: URGENT CARE | Facility: PHYSICIAN GROUP | Age: 25
End: 2025-03-07
Payer: COMMERCIAL

## 2025-03-10 ENCOUNTER — TELEPHONE (OUTPATIENT)
Dept: URGENT CARE | Facility: PHYSICIAN GROUP | Age: 25
End: 2025-03-10
Payer: COMMERCIAL

## 2025-03-10 DIAGNOSIS — R39.9 UTI SYMPTOMS: ICD-10-CM

## 2025-03-10 RX ORDER — SULFAMETHOXAZOLE AND TRIMETHOPRIM 800; 160 MG/1; MG/1
1 TABLET ORAL 2 TIMES DAILY
Qty: 14 TABLET | Refills: 0 | Status: SHIPPED | OUTPATIENT
Start: 2025-03-10 | End: 2025-03-17

## 2025-03-10 NOTE — PROGRESS NOTES
Previous urine culture order was collected but did not seem to make it to the lab.  Another order of urine culture was placed.  Patient will be coming in today so we can collect his urine to be sure to send to the lab this time.

## 2025-03-10 NOTE — TELEPHONE ENCOUNTER
Spoke to patient on the phone and he was questioning the urine culture results.  It appears no results yet.  Says urine culture was collected but does not appear to be writing.  We will call the lab regarding this.  In the meantime, patient states he has been having intermittent urgency.  Symptoms have not been worsening but on and off.  Through shared decision making, it was agreed to try course of antibiotics to treat for possible urinary tract infection.  Recommend return to the urgent care if any worsening symptoms.  He does have an established urology appointment on 3/28/2025.

## 2025-03-28 ENCOUNTER — HOSPITAL ENCOUNTER (OUTPATIENT)
Facility: MEDICAL CENTER | Age: 25
End: 2025-03-28
Attending: PHYSICIAN ASSISTANT
Payer: COMMERCIAL

## 2025-03-28 ENCOUNTER — OFFICE VISIT (OUTPATIENT)
Dept: UROLOGY | Facility: MEDICAL CENTER | Age: 25
End: 2025-03-28
Payer: COMMERCIAL

## 2025-03-28 DIAGNOSIS — R39.15 URINARY URGENCY: ICD-10-CM

## 2025-03-28 DIAGNOSIS — N41.1 CHRONIC PROSTATITIS: Primary | ICD-10-CM

## 2025-03-28 LAB
APPEARANCE UR: ABNORMAL
BACTERIA #/AREA URNS HPF: NORMAL /HPF
BILIRUB UR QL STRIP.AUTO: NEGATIVE
CASTS URNS QL MICRO: NORMAL /LPF (ref 0–2)
COLOR UR: YELLOW
EPITHELIAL CELLS 1715: NORMAL /HPF (ref 0–5)
GLUCOSE UR STRIP.AUTO-MCNC: NEGATIVE MG/DL
KETONES UR STRIP.AUTO-MCNC: NEGATIVE MG/DL
LEUKOCYTE ESTERASE UR QL STRIP.AUTO: NEGATIVE
MICRO URNS: ABNORMAL
NITRITE UR QL STRIP.AUTO: NEGATIVE
PH UR STRIP.AUTO: 8.5 [PH] (ref 5–8)
POC POST-VOID: 14 ML
POC PRE-VOID: NORMAL
PROT UR QL STRIP: NEGATIVE MG/DL
RBC # URNS HPF: NORMAL /HPF (ref 0–2)
RBC UR QL AUTO: NEGATIVE
SP GR UR STRIP.AUTO: 1.02
UROBILINOGEN UR STRIP.AUTO-MCNC: 0.2 EU/DL
WBC #/AREA URNS HPF: NORMAL /HPF

## 2025-03-28 PROCEDURE — 99204 OFFICE O/P NEW MOD 45 MIN: CPT | Performed by: PHYSICIAN ASSISTANT

## 2025-03-28 PROCEDURE — 81001 URINALYSIS AUTO W/SCOPE: CPT

## 2025-03-28 PROCEDURE — 51798 US URINE CAPACITY MEASURE: CPT | Performed by: PHYSICIAN ASSISTANT

## 2025-03-28 RX ORDER — TAMSULOSIN HYDROCHLORIDE 0.4 MG/1
0.4 CAPSULE ORAL
Qty: 30 CAPSULE | Refills: 1 | Status: SHIPPED | OUTPATIENT
Start: 2025-03-28

## 2025-03-28 NOTE — PROGRESS NOTES
Reason for visit:   Urinary frequency    HPI   Jhon Hoff is a 24 y.o. male presenting with symptoms of urinary frequency x4 months. The patient initially presented to acute care on 3/4/25 with complaints of urinary frequency. Urinalysis at that time was negative.     The patient reports symptoms of incomplete emptying and urinary frequency. He reports that symptoms are worse in the AM. He drinks coffee and orange juice in the AM. Denies any discomfort with ejaculation or sexual intercourse. Does feel that urine is occasionally cloudy.      He does have some dysuria. Denies any concern for STIs. Denies any trouble with constipation and diarrhea.     Denies a history of kidney stones and recurrent UTIs. Denies any family history of  CA. Lifelong non-smoker. Denies any visible blood in the urine.     He presents today with is wife, she is a CNA.    PVR: 14 mL    Past Medical History:   Diagnosis Date    GERD (gastroesophageal reflux disease)          No past surgical history on file.         Urologic PMH:    Denies     Family History:   Denies     Social History     Socioeconomic History    Marital status: Single     Spouse name: Not on file    Number of children: Not on file    Years of education: Not on file    Highest education level: Not on file   Occupational History    Not on file   Tobacco Use    Smoking status: Never    Smokeless tobacco: Current     Types: Chew   Vaping Use    Vaping status: Never Used   Substance and Sexual Activity    Alcohol use: Yes     Comment: Rare    Drug use: Yes     Types: Inhaled, Marijuana     Comment: 3-5 times weekly    Sexual activity: Yes     Partners: Female     Birth control/protection: None   Other Topics Concern    Behavioral problems Not Asked    Interpersonal relationships Not Asked    Sad or not enjoying activities Not Asked    Suicidal thoughts Not Asked    Poor school performance Not Asked    Reading difficulties Not Asked    Speech difficulties Not Asked     Writing difficulties Not Asked    Inadequate sleep Not Asked    Excessive TV viewing Not Asked    Excessive video game use Not Asked    Inadequate exercise Not Asked    Sports related Not Asked    Poor diet Not Asked    Family concerns for drug/alcohol abuse Not Asked    Poor oral hygiene Not Asked    Bike safety Not Asked    Family concerns vehicle safety Not Asked   Social History Narrative    Not on file     Social Drivers of Health     Financial Resource Strain: Not on file   Food Insecurity: Not on file   Transportation Needs: Not on file   Physical Activity: Not on file   Stress: Not on file   Social Connections: Not on file   Intimate Partner Violence: Not on file   Housing Stability: Not on file         No Known Allergies      Current Outpatient Medications   Medication Sig Dispense Refill    naproxen (NAPROSYN) 500 MG Tab Take 1 Tablet by mouth 2 times a day with meals. (Patient not taking: Reported on 3/4/2025) 20 Tablet 0    meclizine (ANTIVERT) 25 MG Tab Take 1 Tablet by mouth 3 times a day as needed for Vertigo. (Patient not taking: Reported on 3/4/2025) 60 Tablet 0    scopolamine (TRANSDERM-SCOP, 1.5 MG,) 1 mg/72hr PATCH 72 HR Place 1 Patch on the skin every 72 hours. (Patient not taking: Reported on 3/4/2025) 4 Patch 3    ondansetron (ZOFRAN ODT) 4 MG TABLET DISPERSIBLE Take 1 Tablet by mouth every 6 hours as needed for Nausea. (Patient not taking: Reported on 3/4/2025) 30 Tablet 2     No current facility-administered medications for this visit.         Review of Systems:   GENERAL: No fever, chills, nausea, vomiting   RESPIRATORY: No shortness of breath, wheezing, or cough   CARDIAC: No chest pain, palpitations, irregular heart rhythm, or chest pressure   GASTROINTESTINAL: No abdominal pain or distention    GENITROURINARY: See HPI   NEUROLOGIC: No weakness, blackouts, seizure or stroke   HEMATOLOGIC: No history of bleeding disorders, hypercoagulability, DVT, or PE   ENDOCRINE: No history of diabetes,  adrenal disorders, or thyroid disorders    All other review of systems was negative     There were no vitals filed for this visit.      Physical Exam:   General:  Alert & Oriented, NAD   Skin: Warm/Dry, no evidence of infection or rash   HEENT: normocephalic   Thorax: No CVA tenderness   Abdomen: Soft, nontender to superficial/deep palpation, nondistended  Extremities: No distal edema     Assessment and plan:     Inflammatory prostatitis    -- PVR reviewed and patient is emptying his bladder well  -- Urine sent for micro UA  -- Trial of tamsulosin 0.4 mg daily -- reviewed MOA and common side effects associated - recommend treatment for 2-3 months and then can consider a trial off of the medication if symptoms have improved  -- Discussed option for PFPT -- will do stretching at home  -- Encouraged hydration with at least 48-64 ounces of water per day   -- Suggested warm tub soaks and NSAID's for discomfort as needed   -- Counseled him on food triggers and instructed him to avoid ETOH particularly beer, spicy foods and caffeine -- avoid coffee and orange juice in the AM  -- I will see him back in 3 months or sooner if any worrisome symptoms or concerns arise, and I have told the patient to call if he develops any fevers/chills or general feelings of being unwell       I personally reviewed the patient's pertinent medical records and labs/images available to me.     Michel Coyne PA-C

## 2025-03-28 NOTE — LETTER
March 28, 2025    To Whom It May Concern:         This is confirmation that Jhon Hoff attended his scheduled appointment with Michel Coyne P.A.-C. on 3/28/25.    He is being actively treated for a urologic condition. Please allow adequate accomodation to the restroom until 6/28/25.          If you have any questions please do not hesitate to call me at the phone number listed below.    Sincerely,          Michel Coyne P.A.-C.  [phone]

## 2025-03-31 ENCOUNTER — RESULTS FOLLOW-UP (OUTPATIENT)
Dept: UROLOGY | Facility: MEDICAL CENTER | Age: 25
End: 2025-03-31